# Patient Record
Sex: FEMALE | Race: WHITE | NOT HISPANIC OR LATINO
[De-identification: names, ages, dates, MRNs, and addresses within clinical notes are randomized per-mention and may not be internally consistent; named-entity substitution may affect disease eponyms.]

---

## 2018-06-09 PROBLEM — Z00.00 ENCOUNTER FOR PREVENTIVE HEALTH EXAMINATION: Status: ACTIVE | Noted: 2018-06-09

## 2018-07-09 ENCOUNTER — LABORATORY RESULT (OUTPATIENT)
Age: 33
End: 2018-07-09

## 2018-07-09 ENCOUNTER — APPOINTMENT (OUTPATIENT)
Dept: RHEUMATOLOGY | Facility: CLINIC | Age: 33
End: 2018-07-09
Payer: MEDICAID

## 2018-07-09 VITALS
OXYGEN SATURATION: 98 % | DIASTOLIC BLOOD PRESSURE: 68 MMHG | SYSTOLIC BLOOD PRESSURE: 110 MMHG | HEIGHT: 62 IN | WEIGHT: 100 LBS | RESPIRATION RATE: 20 BRPM | HEART RATE: 81 BPM | BODY MASS INDEX: 18.4 KG/M2

## 2018-07-09 DIAGNOSIS — R21 RASH AND OTHER NONSPECIFIC SKIN ERUPTION: ICD-10-CM

## 2018-07-09 PROCEDURE — 36415 COLL VENOUS BLD VENIPUNCTURE: CPT

## 2018-07-09 PROCEDURE — 99205 OFFICE O/P NEW HI 60 MIN: CPT | Mod: 25

## 2018-07-19 ENCOUNTER — TRANSCRIPTION ENCOUNTER (OUTPATIENT)
Age: 33
End: 2018-07-19

## 2018-07-24 ENCOUNTER — APPOINTMENT (OUTPATIENT)
Dept: RHEUMATOLOGY | Facility: CLINIC | Age: 33
End: 2018-07-24
Payer: MEDICAID

## 2018-07-24 VITALS
SYSTOLIC BLOOD PRESSURE: 107 MMHG | OXYGEN SATURATION: 98 % | HEART RATE: 80 BPM | RESPIRATION RATE: 20 BRPM | DIASTOLIC BLOOD PRESSURE: 73 MMHG

## 2018-07-24 DIAGNOSIS — Z87.39 PERSONAL HISTORY OF OTHER DISEASES OF THE MUSCULOSKELETAL SYSTEM AND CONNECTIVE TISSUE: ICD-10-CM

## 2018-07-24 DIAGNOSIS — R21 RASH AND OTHER NONSPECIFIC SKIN ERUPTION: ICD-10-CM

## 2018-07-24 DIAGNOSIS — Z78.9 OTHER SPECIFIED HEALTH STATUS: ICD-10-CM

## 2018-07-24 PROCEDURE — 99214 OFFICE O/P EST MOD 30 MIN: CPT

## 2018-07-31 ENCOUNTER — RX CHANGE (OUTPATIENT)
Age: 33
End: 2018-07-31

## 2018-07-31 RX ORDER — DOXYCYCLINE HYCLATE 100 MG/1
100 CAPSULE ORAL
Qty: 42 | Refills: 0 | Status: DISCONTINUED | COMMUNITY
Start: 2018-07-25 | End: 2018-07-31

## 2018-09-10 ENCOUNTER — APPOINTMENT (OUTPATIENT)
Dept: RHEUMATOLOGY | Facility: CLINIC | Age: 33
End: 2018-09-10
Payer: MEDICAID

## 2018-09-10 ENCOUNTER — LABORATORY RESULT (OUTPATIENT)
Age: 33
End: 2018-09-10

## 2018-09-10 VITALS
RESPIRATION RATE: 18 BRPM | SYSTOLIC BLOOD PRESSURE: 108 MMHG | HEART RATE: 77 BPM | TEMPERATURE: 98.7 F | DIASTOLIC BLOOD PRESSURE: 77 MMHG | OXYGEN SATURATION: 100 %

## 2018-09-10 PROCEDURE — 99215 OFFICE O/P EST HI 40 MIN: CPT | Mod: 25

## 2018-09-10 PROCEDURE — 36415 COLL VENOUS BLD VENIPUNCTURE: CPT

## 2018-11-02 ENCOUNTER — APPOINTMENT (OUTPATIENT)
Dept: RHEUMATOLOGY | Facility: CLINIC | Age: 33
End: 2018-11-02
Payer: MEDICAID

## 2018-11-02 VITALS
HEART RATE: 77 BPM | SYSTOLIC BLOOD PRESSURE: 110 MMHG | TEMPERATURE: 98.2 F | DIASTOLIC BLOOD PRESSURE: 70 MMHG | OXYGEN SATURATION: 100 % | RESPIRATION RATE: 18 BRPM

## 2018-11-02 DIAGNOSIS — Z86.19 PERSONAL HISTORY OF OTHER INFECTIOUS AND PARASITIC DISEASES: ICD-10-CM

## 2018-11-02 PROCEDURE — 99214 OFFICE O/P EST MOD 30 MIN: CPT

## 2018-11-05 PROBLEM — Z86.19 HISTORY OF LYME DISEASE: Status: RESOLVED | Noted: 2018-07-24 | Resolved: 2018-11-05

## 2018-11-05 RX ORDER — PHENAZOPYRIDINE HYDROCHLORIDE 200 MG/1
200 TABLET ORAL
Qty: 6 | Refills: 0 | Status: COMPLETED | COMMUNITY
Start: 2018-10-08

## 2018-11-05 RX ORDER — CEPHALEXIN 500 MG/1
500 CAPSULE ORAL
Qty: 14 | Refills: 0 | Status: COMPLETED | COMMUNITY
Start: 2018-10-08

## 2018-11-05 RX ORDER — BENZONATATE 200 MG/1
200 CAPSULE ORAL
Qty: 20 | Refills: 0 | Status: COMPLETED | COMMUNITY
Start: 2018-10-08

## 2018-11-28 ENCOUNTER — TRANSCRIPTION ENCOUNTER (OUTPATIENT)
Age: 33
End: 2018-11-28

## 2018-12-20 ENCOUNTER — APPOINTMENT (OUTPATIENT)
Dept: RHEUMATOLOGY | Facility: CLINIC | Age: 33
End: 2018-12-20
Payer: MEDICAID

## 2018-12-20 PROCEDURE — 99214 OFFICE O/P EST MOD 30 MIN: CPT | Mod: 25

## 2018-12-20 PROCEDURE — 36415 COLL VENOUS BLD VENIPUNCTURE: CPT

## 2018-12-20 RX ORDER — CEFUROXIME AXETIL 500 MG/1
500 TABLET ORAL
Qty: 42 | Refills: 0 | Status: DISCONTINUED | COMMUNITY
Start: 2018-07-31 | End: 2018-12-20

## 2018-12-20 RX ORDER — DULOXETINE HYDROCHLORIDE 30 MG/1
30 CAPSULE, DELAYED RELEASE PELLETS ORAL
Qty: 60 | Refills: 2 | Status: DISCONTINUED | COMMUNITY
Start: 2018-09-10 | End: 2018-12-20

## 2019-01-24 LAB
A PHAGOCYTOPH IGG TITR SER IF: NORMAL TITER
A PHAGOCYTOPH IGG TITR SER IF: NORMAL TITER
ALBUMIN MFR SERPL ELPH: 62.6 %
ALBUMIN SERPL ELPH-MCNC: 4.8 G/DL
ALBUMIN SERPL ELPH-MCNC: 4.9 G/DL
ALBUMIN SERPL-MCNC: 4.7 G/DL
ALBUMIN/GLOB SERPL: 1.7 RATIO
ALP BLD-CCNC: 45 U/L
ALP BLD-CCNC: 49 U/L
ALPHA1 GLOB MFR SERPL ELPH: 4.4 %
ALPHA1 GLOB SERPL ELPH-MCNC: 0.3 G/DL
ALPHA2 GLOB MFR SERPL ELPH: 9.6 %
ALPHA2 GLOB SERPL ELPH-MCNC: 0.7 G/DL
ALT SERPL-CCNC: 21 U/L
ALT SERPL-CCNC: 26 U/L
ANA PAT FLD IF-IMP: NORMAL
ANA SER IF-ACNC: ABNORMAL
ANION GAP SERPL CALC-SCNC: 11 MMOL/L
ANION GAP SERPL CALC-SCNC: 12 MMOL/L
APPEARANCE: CLEAR
APTT BLD: 27.7 SEC
AQP4 H2O CHANNEL AB SERPL IA-ACNC: NEGATIVE
AST SERPL-CCNC: 23 U/L
AST SERPL-CCNC: 30 U/L
B BURGDOR AB SER QL IA: NORMAL
B BURGDOR AB SER QL IA: NORMAL
B BURGDOR AB SER-IMP: NEGATIVE
B BURGDOR AB SER-IMP: NEGATIVE
B BURGDOR DNA SPEC QL NAA+PROBE: NEGATIVE
B BURGDOR IGM PATRN SER IB-IMP: NEGATIVE
B BURGDOR IGM PATRN SER IB-IMP: POSITIVE
B BURGDOR18/20KD IGM SER QL IB: NORMAL
B BURGDOR18/20KD IGM SER QL IB: NORMAL
B BURGDOR18KD IGG SER QL IB: NORMAL
B BURGDOR18KD IGG SER QL IB: NORMAL
B BURGDOR23KD IGG SER QL IB: NORMAL
B BURGDOR23KD IGG SER QL IB: NORMAL
B BURGDOR23KD IGM SER QL IB: NORMAL
B BURGDOR23KD IGM SER QL IB: NORMAL
B BURGDOR28KD AB SER QL IB: NORMAL
B BURGDOR28KD AB SER QL IB: NORMAL
B BURGDOR28KD IGG SER QL IB: NORMAL
B BURGDOR28KD IGG SER QL IB: NORMAL
B BURGDOR30KD AB SER QL IB: NORMAL
B BURGDOR30KD AB SER QL IB: NORMAL
B BURGDOR30KD IGG SER QL IB: NORMAL
B BURGDOR30KD IGG SER QL IB: NORMAL
B BURGDOR31KD IGG SER QL IB: NORMAL
B BURGDOR31KD IGG SER QL IB: NORMAL
B BURGDOR31KD IGM SER QL IB: NORMAL
B BURGDOR31KD IGM SER QL IB: NORMAL
B BURGDOR39KD IGG SER QL IB: NORMAL
B BURGDOR39KD IGG SER QL IB: NORMAL
B BURGDOR39KD IGM SER QL IB: PRESENT
B BURGDOR39KD IGM SER QL IB: PRESENT
B BURGDOR41KD IGG SER QL IB: PRESENT
B BURGDOR41KD IGG SER QL IB: PRESENT
B BURGDOR41KD IGM SER QL IB: NORMAL
B BURGDOR41KD IGM SER QL IB: PRESENT
B BURGDOR45KD AB SER QL IB: NORMAL
B BURGDOR45KD AB SER QL IB: NORMAL
B BURGDOR45KD IGG SER QL IB: NORMAL
B BURGDOR45KD IGG SER QL IB: NORMAL
B BURGDOR58KD AB SER QL IB: NORMAL
B BURGDOR58KD AB SER QL IB: NORMAL
B BURGDOR58KD IGG SER QL IB: PRESENT
B BURGDOR58KD IGG SER QL IB: PRESENT
B BURGDOR66KD IGG SER QL IB: NORMAL
B BURGDOR66KD IGG SER QL IB: PRESENT
B BURGDOR66KD IGM SER QL IB: NORMAL
B BURGDOR66KD IGM SER QL IB: NORMAL
B BURGDOR93KD IGG SER QL IB: NORMAL
B BURGDOR93KD IGG SER QL IB: NORMAL
B BURGDOR93KD IGM SER QL IB: NORMAL
B BURGDOR93KD IGM SER QL IB: NORMAL
B MICROTI IGG TITR SER: NORMAL TITER
B MICROTI IGG TITR SER: NORMAL TITER
B-GLOBULIN MFR SERPL ELPH: 10.6 %
B-GLOBULIN SERPL ELPH-MCNC: 0.8 G/DL
B2 GLYCOPROT1 AB SER QL: NEGATIVE
BACTERIA: NEGATIVE
BASOPHILS # BLD AUTO: 0.03 K/UL
BASOPHILS NFR BLD AUTO: 0.3 %
BILIRUB SERPL-MCNC: 0.3 MG/DL
BILIRUB SERPL-MCNC: 0.3 MG/DL
BILIRUBIN URINE: NEGATIVE
BLOOD URINE: NEGATIVE
BUN SERPL-MCNC: 10 MG/DL
BUN SERPL-MCNC: 13 MG/DL
C3 SERPL-MCNC: 130 MG/DL
C4 SERPL-MCNC: 18 MG/DL
CA VI IGA AB: 4.2 EU/ML
CA VI IGG AB: 10.8 EU/ML
CA VI IGM AB: 16.1 EU/ML
CALCIUM SERPL-MCNC: 9.7 MG/DL
CALCIUM SERPL-MCNC: 9.8 MG/DL
CARDIOLIPIN AB SER IA-ACNC: NEGATIVE
CCP AB SER IA-ACNC: <8 UNITS
CENTROMERE IGG SER-ACNC: <0.2 AL
CHLORIDE SERPL-SCNC: 103 MMOL/L
CHLORIDE SERPL-SCNC: 104 MMOL/L
CHROMATIN AB SERPL-ACNC: <0.2 AL
CO2 SERPL-SCNC: 24 MMOL/L
CO2 SERPL-SCNC: 26 MMOL/L
COLOR: YELLOW
CONFIRM: 21.4 SEC
CREAT SERPL-MCNC: 0.87 MG/DL
CREAT SERPL-MCNC: 0.92 MG/DL
CRP SERPL-MCNC: 0.11 MG/DL
CRP SERPL-MCNC: <0.2 MG/DL
DEPRECATED KAPPA LC FREE/LAMBDA SER: 0.79 RATIO
DRVVT IMM 1:2 NP PPP: NORMAL
DRVVT SCREEN TO CONFIRM RATIO: 1.13 RATIO
DSDNA AB SER-ACNC: <12 IU/ML
DSDNA AB SER-ACNC: <12 IU/ML
E CHAFFEENSIS IGG TITR SER IF: NORMAL TITER
E CHAFFEENSIS IGG TITR SER IF: NORMAL TITER
ENA RNP AB SER IA-ACNC: 1.1 AL
ENA SCL70 IGG SER IA-ACNC: 1.1 AL
ENA SM AB SER IA-ACNC: <0.2 AL
ENA SS-A AB SER IA-ACNC: <0.2 AL
ENA SS-B AB SER IA-ACNC: <0.2 AL
ENDOMYSIUM IGA SER QL: NEGATIVE
ENDOMYSIUM IGA TITR SER: NORMAL
EOSINOPHIL # BLD AUTO: 0.08 K/UL
EOSINOPHIL NFR BLD AUTO: 0.8 %
ERYTHROCYTE [SEDIMENTATION RATE] IN BLOOD BY WESTERGREN METHOD: 2 MM/HR
FOLATE SERPL-MCNC: >20 NG/ML
G6PD SER-CCNC: 13.2 U/G HGB
GAMMA GLOB FLD ELPH-MCNC: 1 G/DL
GAMMA GLOB MFR SERPL ELPH: 12.8 %
GLIADIN IGA SER QL: <5 UNITS
GLIADIN IGG SER QL: <5 UNITS
GLIADIN PEPTIDE IGA SER-ACNC: NEGATIVE
GLIADIN PEPTIDE IGG SER-ACNC: NEGATIVE
GLUCOSE QUALITATIVE U: NEGATIVE MG/DL
GLUCOSE SERPL-MCNC: 102 MG/DL
GLUCOSE SERPL-MCNC: 105 MG/DL
HCT VFR BLD CALC: 40.5 %
HGB BLD-MCNC: 13.1 G/DL
HISTONE AB SER QL: 0.4 UNITS
HYALINE CASTS: 2 /LPF
IGA SER QL IEP: 96 MG/DL
IGG SER QL IEP: 1023 MG/DL
IGM SER QL IEP: 219 MG/DL
IMM GRANULOCYTES NFR BLD AUTO: 0.2 %
INTERPRETATION SERPL IEP-IMP: NORMAL
KAPPA LC CSF-MCNC: 0.94 MG/DL
KAPPA LC SERPL-MCNC: 0.74 MG/DL
KETONES URINE: NEGATIVE
LEUKOCYTE ESTERASE URINE: ABNORMAL
LYMPHOCYTES # BLD AUTO: 3.04 K/UL
LYMPHOCYTES NFR BLD AUTO: 32 %
M PROTEIN SPEC IFE-MCNC: NORMAL
MAN DIFF?: NORMAL
MCHC RBC-ENTMCNC: 32.3 GM/DL
MCHC RBC-ENTMCNC: 32.3 PG
MCV RBC AUTO: 99.8 FL
MICROSCOPIC-UA: NORMAL
MISCELLANEOUS TEST: NORMAL
MONOCYTES # BLD AUTO: 0.57 K/UL
MONOCYTES NFR BLD AUTO: 6 %
NEUTROPHILS # BLD AUTO: 5.77 K/UL
NEUTROPHILS NFR BLD AUTO: 60.7 %
NITRITE URINE: NEGATIVE
PARANEOPLASTIC AB PNL SER: NORMAL
PH URINE: 6.5
PLATELET # BLD AUTO: 371 K/UL
POTASSIUM SERPL-SCNC: 4.6 MMOL/L
POTASSIUM SERPL-SCNC: 5 MMOL/L
PROC NAME: NORMAL
PROT SERPL-MCNC: 7.2 G/DL
PROT SERPL-MCNC: 7.5 G/DL
PROTEIN URINE: NEGATIVE MG/DL
PSP IGA AB: 12.6 EU/ML
PSP IGG AB: 5.1 EU/ML
PSP IGM AB: 9.4 EU/ML
RBC # BLD: 4.06 M/UL
RBC # FLD: 13.5 %
RED BLOOD CELLS URINE: 0 /HPF
RF+CCP IGG SER-IMP: NEGATIVE
RHEUMATOID FACT SER QL: 8 IU/ML
RIBOSOMAL P AB SER IA-ACNC: <0.2 AL
RNA POLYMERASE III IGG: <10 U
SCREEN DRVVT: 29.8 SEC
SEROLOGY COMMENTS: NORMAL
SILICA CLOTTING TIME INTERPRETATION: NORMAL
SILICA CLOTTING TIME S/C: 1.1 RATIO
SODIUM SERPL-SCNC: 139 MMOL/L
SODIUM SERPL-SCNC: 141 MMOL/L
SP-1 IGA AB: 6.4 EU/ML
SP-1 IGG AB: 2.4 EU/ML
SP-1 IGM AB: 12.9 EU/ML
SPECIFIC GRAVITY URINE: 1.01
SQUAMOUS EPITHELIAL CELLS: 4 /HPF
SSDNA AB SER-ACNC: <20 EU
SSDNA AB SER-ACNC: <20 EU
THYROGLOB AB SERPL-ACNC: <20 IU/ML
THYROPEROXIDASE AB SERPL IA-ACNC: 11.7 IU/ML
TTG IGA SER IA-ACNC: <5 UNITS
TTG IGA SER-ACNC: NEGATIVE
TTG IGG SER IA-ACNC: <5 UNITS
TTG IGG SER IA-ACNC: NEGATIVE
URATE SERPL-MCNC: 4.8 MG/DL
UROBILINOGEN URINE: NEGATIVE MG/DL
VIT B12 SERPL-MCNC: 715 PG/ML
WBC # FLD AUTO: 9.51 K/UL
WHITE BLOOD CELLS URINE: 0 /HPF

## 2019-01-31 ENCOUNTER — APPOINTMENT (OUTPATIENT)
Dept: RHEUMATOLOGY | Facility: CLINIC | Age: 34
End: 2019-01-31
Payer: COMMERCIAL

## 2019-01-31 VITALS
HEART RATE: 96 BPM | OXYGEN SATURATION: 97 % | BODY MASS INDEX: 18.4 KG/M2 | SYSTOLIC BLOOD PRESSURE: 126 MMHG | DIASTOLIC BLOOD PRESSURE: 80 MMHG | RESPIRATION RATE: 14 BRPM | WEIGHT: 100 LBS | HEIGHT: 62 IN

## 2019-01-31 DIAGNOSIS — R63.0 ANOREXIA: ICD-10-CM

## 2019-01-31 PROCEDURE — 99214 OFFICE O/P EST MOD 30 MIN: CPT

## 2019-01-31 RX ORDER — BUPROPION HYDROCHLORIDE 150 MG/1
150 TABLET, EXTENDED RELEASE ORAL DAILY
Qty: 30 | Refills: 2 | Status: DISCONTINUED | COMMUNITY
Start: 2018-12-20 | End: 2019-01-31

## 2019-02-04 PROBLEM — R63.0 ANOREXIA: Status: ACTIVE | Noted: 2018-12-20

## 2019-03-02 ENCOUNTER — TRANSCRIPTION ENCOUNTER (OUTPATIENT)
Age: 34
End: 2019-03-02

## 2019-03-29 ENCOUNTER — APPOINTMENT (OUTPATIENT)
Dept: RHEUMATOLOGY | Facility: CLINIC | Age: 34
End: 2019-03-29
Payer: COMMERCIAL

## 2019-03-29 VITALS
OXYGEN SATURATION: 100 % | RESPIRATION RATE: 14 BRPM | HEART RATE: 92 BPM | DIASTOLIC BLOOD PRESSURE: 77 MMHG | SYSTOLIC BLOOD PRESSURE: 114 MMHG

## 2019-03-29 DIAGNOSIS — B35.4 TINEA CORPORIS: ICD-10-CM

## 2019-03-29 PROCEDURE — 36415 COLL VENOUS BLD VENIPUNCTURE: CPT

## 2019-03-29 PROCEDURE — 99215 OFFICE O/P EST HI 40 MIN: CPT | Mod: 25

## 2019-03-29 RX ORDER — ACETAMINOPHEN 500 MG/1
TABLET ORAL
Refills: 0 | Status: DISCONTINUED | COMMUNITY
End: 2019-03-29

## 2019-04-12 LAB
ALBUMIN SERPL ELPH-MCNC: 4.5 G/DL
ALP BLD-CCNC: 44 U/L
ALT SERPL-CCNC: 28 U/L
ANION GAP SERPL CALC-SCNC: 14 MMOL/L
AST SERPL-CCNC: 25 U/L
BASOPHILS # BLD AUTO: 0.06 K/UL
BASOPHILS NFR BLD AUTO: 0.5 %
BILIRUB SERPL-MCNC: 0.2 MG/DL
BUN SERPL-MCNC: 8 MG/DL
CALCIUM SERPL-MCNC: 9.6 MG/DL
CHLORIDE SERPL-SCNC: 102 MMOL/L
CO2 SERPL-SCNC: 26 MMOL/L
CREAT SERPL-MCNC: 0.9 MG/DL
CRP SERPL-MCNC: 0.26 MG/DL
ENA RNP AB SER IA-ACNC: 0.9 AL
ENA SCL70 IGG SER IA-ACNC: 1.2 AL
ENA SM AB SER IA-ACNC: <0.2 AL
EOSINOPHIL # BLD AUTO: 0.09 K/UL
EOSINOPHIL NFR BLD AUTO: 0.8 %
ERYTHROCYTE [SEDIMENTATION RATE] IN BLOOD BY WESTERGREN METHOD: 2 MM/HR
GLUCOSE SERPL-MCNC: 85 MG/DL
HCT VFR BLD CALC: 41.6 %
HGB BLD-MCNC: 13 G/DL
IMM GRANULOCYTES NFR BLD AUTO: 0.2 %
LYMPHOCYTES # BLD AUTO: 4.17 K/UL
LYMPHOCYTES NFR BLD AUTO: 36.6 %
MAN DIFF?: NORMAL
MCHC RBC-ENTMCNC: 31.3 GM/DL
MCHC RBC-ENTMCNC: 31.9 PG
MCV RBC AUTO: 102.2 FL
MONOCYTES # BLD AUTO: 0.53 K/UL
MONOCYTES NFR BLD AUTO: 4.6 %
NEUTROPHILS # BLD AUTO: 6.53 K/UL
NEUTROPHILS NFR BLD AUTO: 57.3 %
PLATELET # BLD AUTO: 352 K/UL
POTASSIUM SERPL-SCNC: 4.5 MMOL/L
PROT SERPL-MCNC: 7 G/DL
RBC # BLD: 4.07 M/UL
RBC # FLD: 12.5 %
SODIUM SERPL-SCNC: 142 MMOL/L
WBC # FLD AUTO: 11.4 K/UL

## 2019-04-15 ENCOUNTER — TRANSCRIPTION ENCOUNTER (OUTPATIENT)
Age: 34
End: 2019-04-15

## 2019-06-24 ENCOUNTER — APPOINTMENT (OUTPATIENT)
Dept: RHEUMATOLOGY | Facility: CLINIC | Age: 34
End: 2019-06-24
Payer: COMMERCIAL

## 2019-06-24 VITALS
HEART RATE: 85 BPM | DIASTOLIC BLOOD PRESSURE: 83 MMHG | TEMPERATURE: 98.3 F | OXYGEN SATURATION: 100 % | SYSTOLIC BLOOD PRESSURE: 137 MMHG

## 2019-06-24 DIAGNOSIS — R76.8 OTHER SPECIFIED ABNORMAL IMMUNOLOGICAL FINDINGS IN SERUM: ICD-10-CM

## 2019-06-24 DIAGNOSIS — M25.50 PAIN IN UNSPECIFIED JOINT: ICD-10-CM

## 2019-06-24 DIAGNOSIS — M79.7 FIBROMYALGIA: ICD-10-CM

## 2019-06-24 DIAGNOSIS — L65.9 NONSCARRING HAIR LOSS, UNSPECIFIED: ICD-10-CM

## 2019-06-24 DIAGNOSIS — K11.7 SYSTEMIC INVOLVEMENT OF CONNECTIVE TISSUE, UNSPECIFIED: ICD-10-CM

## 2019-06-24 DIAGNOSIS — M35.9 SYSTEMIC INVOLVEMENT OF CONNECTIVE TISSUE, UNSPECIFIED: ICD-10-CM

## 2019-06-24 DIAGNOSIS — R63.4 ABNORMAL WEIGHT LOSS: ICD-10-CM

## 2019-06-24 DIAGNOSIS — G25.2 OTHER SPECIFIED FORMS OF TREMOR: ICD-10-CM

## 2019-06-24 DIAGNOSIS — I73.00 RAYNAUD'S SYNDROME W/OUT GANGRENE: ICD-10-CM

## 2019-06-24 DIAGNOSIS — R21 RASH AND OTHER NONSPECIFIC SKIN ERUPTION: ICD-10-CM

## 2019-06-24 PROCEDURE — 36415 COLL VENOUS BLD VENIPUNCTURE: CPT

## 2019-06-24 PROCEDURE — 99214 OFFICE O/P EST MOD 30 MIN: CPT | Mod: 25

## 2019-06-24 RX ORDER — CLOTRIMAZOLE AND BETAMETHASONE DIPROPIONATE 10; .5 MG/G; MG/G
1-0.05 CREAM TOPICAL TWICE DAILY
Qty: 1 | Refills: 0 | Status: DISCONTINUED | COMMUNITY
Start: 2019-04-15 | End: 2019-06-24

## 2019-06-24 NOTE — HISTORY OF PRESENT ILLNESS
[___ Week(s) Ago] : [unfilled] week(s) ago [FreeTextEntry1] : - mild arthralgias, not very bothersome but persistent at times. Worsens after exertion or stress.\par - Raynaud's mildly bothersome at times, but denies ulcers/pits currently. Has been fairly stable and not active recently Reports livedo\par - systemic sclerosis panel negative, [facility] labs w/ low Scl70, not likely significant as well as +RNP.\par - fatigue has improved but persists. Has good and bad days\par - discussed start other SNRI \par - ROS unchanged overall\par - discussed options of other po meds vs possible topical compounded creams which were not covered by her insurance [de-identified] : \par \par All other ROS negative except as mentioned in HPI.

## 2019-06-24 NOTE — PHYSICAL EXAM
[General Appearance - In No Acute Distress] : in no acute distress [General Appearance - Alert] : alert [PERRL With Normal Accommodation] : pupils were equal in size, round, and reactive to light [Extraocular Movements] : extraocular movements were intact [Sclera] : the sclera and conjunctiva were normal [Hearing Threshold Finger Rub Not [place]] : hearing was normal [Outer Ear] : the ears and nose were normal in appearance [Oropharynx] : the oropharynx was normal [Nasal Cavity] : the nasal mucosa and septum were normal [Examination Of The Oral Cavity] : the lips and gums were normal [Neck Appearance] : the appearance of the neck was normal [Neck Cervical Mass (___cm)] : no neck mass was observed [Thyroid Diffuse Enlargement] : the thyroid was not enlarged [Jugular Venous Distention Increased] : there was no jugular-venous distention [Respiration, Rhythm And Depth] : normal respiratory rhythm and effort [Thyroid Nodule] : there were no palpable thyroid nodules [Heart Rate And Rhythm] : heart rate was normal and rhythm regular [Auscultation Breath Sounds / Voice Sounds] : lungs were clear to auscultation bilaterally [Heart Sounds] : normal S1 and S2 [Murmurs] : no murmurs [Heart Sounds Gallop] : no gallops [Full Pulse] : the pedal pulses are present [Edema] : there was no peripheral edema [Heart Sounds Pericardial Friction Rub] : no pericardial rub [Abdomen Soft] : soft [Bowel Sounds] : normal bowel sounds [Abdomen Tenderness] : non-tender [Abdomen Mass (___ Cm)] : no abdominal mass palpated [Cervical Lymph Nodes Enlarged Posterior Bilaterally] : posterior cervical [No CVA Tenderness] : no ~M costovertebral angle tenderness [Cervical Lymph Nodes Enlarged Anterior Bilaterally] : anterior cervical [Supraclavicular Lymph Nodes Enlarged Bilaterally] : supraclavicular [No Spinal Tenderness] : no spinal tenderness [Abnormal Walk] : normal gait [Nail Clubbing] : no clubbing  or cyanosis of the fingernails [Motor Tone] : muscle strength and tone were normal [Musculoskeletal - Swelling] : no joint swelling seen [Skin Turgor] : normal skin turgor [Skin Color & Pigmentation] : normal skin color and pigmentation [] : no rash [FreeTextEntry1] : \par Hands- normal\par Wrists- normal\par Elbows- normal\par Shoulders- normal\par Hips- normal\par Knees- normal\par Ankles- normal\par Feet- normal\par MMT 10/10 proximally/distally bilaterally  [Skin Lesions] : no skin lesions [Maculopapular Rash] : A maculopapular rash was noted [Erythematous] : that was erythematous [Excoriated] : that was not excoriated [Blanches with Pressure] : that did not neftali with pressure [Lower Extremities] : on the lower extremities [Trunk] : on the trunk [Motor Exam] : the motor exam was normal [Deep Tendon Reflexes (DTR)] : deep tendon reflexes were 2+ and symmetric [Sensation] : the sensory exam was normal to light touch and pinprick [No Focal Deficits] : no focal deficits [Oriented To Time, Place, And Person] : oriented to person, place, and time [Impaired Insight] : insight and judgment were intact [Mood] : the mood was normal [Affect] : the affect was normal

## 2019-06-24 NOTE — CONSULT LETTER
[Consult Letter:] : I had the pleasure of evaluating your patient, [unfilled]. [Dear  ___] : Dear  [unfilled], [Please see my note below.] : Please see my note below. [Consult Closing:] : Thank you very much for allowing me to participate in the care of this patient.  If you have any questions, please do not hesitate to contact me. [Sincerely,] : Sincerely, [FreeTextEntry3] : Yuri Barreto II, MD\par \par Attending Rheumatologist\par Division of Rheumatology\par [facility]\par    [facility] &\par    Rheumatology at [place],\par    [facility]\par \par \par [facility]\par \par Office: [phone]; [phone] ([place])\par Fax:     [phone]; [phone] ([place])

## 2019-06-24 NOTE — ASSESSMENT
[FreeTextEntry1] : 1. Lyme disease - presenting w/ chronic joint pains, rash on LE.  Unable to tolerate doxy, changed to 21d course of cefuroxime.  Repeat negative.  DNA PCR negative.\par 2. Positive serologies - previous labs w/ slightly positive Scl70 and moderate titer dsDNA.  Repeat serologies still w/ very slight positive Scl70+ and RNP.  DsDNA now negative and 3 separate tests!  Needs to repeat with persistent mild symptoms of Raynaud's, rash, arthralgias..Scleroderma panel normal with Scl70 negative on this test.\par 3. miscarriage - one early first trimester \par 4. Raynaud's - stable disease, will need to follow and trend serologies over time\par 5. Intention tremor - no other neurological effects, has had normal TSH and evaluation in past.\par 6. Chronic fatigue - most bothersome complaint, consider fibromyalgia - no active inflammatory component of disease, serologies negative.\par 7. Xerostomia - no serological evidence of Sjögren's, stable disease\par 8. Anorexia - decreased appetite and po intake. Wt loss over last months.  Has night sweats but denies fever, respiratory complaints.\par \par - remain off duloxetine, Wellbutrin, etc  Start venlafaxine ER 75mg QHS\par - APAP 650-1000mg TID prn\par - ibuprofen 200-800mg 3x weekly at most as needed\par - topical HC BID, if no improvement can give trial of clotrimazole or terbinafine topical\par \par RTO 3 mos

## 2019-07-26 ENCOUNTER — RX CHANGE (OUTPATIENT)
Age: 34
End: 2019-07-26

## 2019-08-12 ENCOUNTER — APPOINTMENT (OUTPATIENT)
Dept: RHEUMATOLOGY | Facility: CLINIC | Age: 34
End: 2019-08-12

## 2019-10-16 ENCOUNTER — FORM ENCOUNTER (OUTPATIENT)
Age: 34
End: 2019-10-16

## 2019-10-17 ENCOUNTER — OUTPATIENT (OUTPATIENT)
Dept: OUTPATIENT SERVICES | Facility: HOSPITAL | Age: 34
LOS: 1 days | End: 2019-10-17
Payer: COMMERCIAL

## 2019-10-17 DIAGNOSIS — R00.0 TACHYCARDIA, UNSPECIFIED: ICD-10-CM

## 2019-10-17 DIAGNOSIS — R94.31 ABNORMAL ELECTROCARDIOGRAM [ECG] [EKG]: ICD-10-CM

## 2019-10-17 DIAGNOSIS — R55 SYNCOPE AND COLLAPSE: ICD-10-CM

## 2019-10-17 PROCEDURE — 93306 TTE W/DOPPLER COMPLETE: CPT

## 2019-10-17 PROCEDURE — 93306 TTE W/DOPPLER COMPLETE: CPT | Mod: 26

## 2019-12-06 LAB
14-3-3 ETA AG SER IA-MCNC: <0.2 NG/ML
ALBUMIN SERPL ELPH-MCNC: 4.9 G/DL
ALP BLD-CCNC: 51 U/L
ALT SERPL-CCNC: 18 U/L
ANION GAP SERPL CALC-SCNC: 14 MMOL/L
AST SERPL-CCNC: 20 U/L
BASOPHILS # BLD AUTO: 0.04 K/UL
BASOPHILS NFR BLD AUTO: 0.4 %
BILIRUB SERPL-MCNC: 0.2 MG/DL
BUN SERPL-MCNC: 9 MG/DL
CALCIUM SERPL-MCNC: 9.8 MG/DL
CHLORIDE SERPL-SCNC: 105 MMOL/L
CK SERPL-CCNC: 157 U/L
CO2 SERPL-SCNC: 22 MMOL/L
CREAT SERPL-MCNC: 1.05 MG/DL
CRP SERPL-MCNC: 0.3 MG/DL
EOSINOPHIL # BLD AUTO: 0.06 K/UL
EOSINOPHIL NFR BLD AUTO: 0.7 %
ERYTHROCYTE [SEDIMENTATION RATE] IN BLOOD BY WESTERGREN METHOD: 21 MM/HR
GLUCOSE SERPL-MCNC: 92 MG/DL
HCT VFR BLD CALC: 43.1 %
HGB BLD-MCNC: 13.6 G/DL
IMM GRANULOCYTES NFR BLD AUTO: 0.2 %
LYMPHOCYTES # BLD AUTO: 3.35 K/UL
LYMPHOCYTES NFR BLD AUTO: 36.9 %
MAN DIFF?: NORMAL
MCHC RBC-ENTMCNC: 31.6 GM/DL
MCHC RBC-ENTMCNC: 32.1 PG
MCV RBC AUTO: 101.7 FL
MISCELLANEOUS TEST: NORMAL
MONOCYTES # BLD AUTO: 0.45 K/UL
MONOCYTES NFR BLD AUTO: 5 %
NEUTROPHILS # BLD AUTO: 5.16 K/UL
NEUTROPHILS NFR BLD AUTO: 56.8 %
PLATELET # BLD AUTO: 334 K/UL
POTASSIUM SERPL-SCNC: 4.6 MMOL/L
PROC NAME: NORMAL
PROT SERPL-MCNC: 7.4 G/DL
RBC # BLD: 4.24 M/UL
RBC # FLD: 13.2 %
SODIUM SERPL-SCNC: 141 MMOL/L
WBC # FLD AUTO: 9.08 K/UL

## 2019-12-26 ENCOUNTER — APPOINTMENT (OUTPATIENT)
Dept: ELECTROPHYSIOLOGY | Facility: CLINIC | Age: 34
End: 2019-12-26
Payer: COMMERCIAL

## 2019-12-26 ENCOUNTER — NON-APPOINTMENT (OUTPATIENT)
Age: 34
End: 2019-12-26

## 2019-12-26 VITALS
HEART RATE: 77 BPM | HEIGHT: 62 IN | DIASTOLIC BLOOD PRESSURE: 73 MMHG | BODY MASS INDEX: 18.4 KG/M2 | WEIGHT: 100 LBS | SYSTOLIC BLOOD PRESSURE: 111 MMHG | OXYGEN SATURATION: 100 %

## 2019-12-26 DIAGNOSIS — F41.9 ANXIETY DISORDER, UNSPECIFIED: ICD-10-CM

## 2019-12-26 DIAGNOSIS — Z87.898 PERSONAL HISTORY OF OTHER SPECIFIED CONDITIONS: ICD-10-CM

## 2019-12-26 DIAGNOSIS — Z82.49 FAMILY HISTORY OF ISCHEMIC HEART DISEASE AND OTHER DISEASES OF THE CIRCULATORY SYSTEM: ICD-10-CM

## 2019-12-26 DIAGNOSIS — Z56.0 UNEMPLOYMENT, UNSPECIFIED: ICD-10-CM

## 2019-12-26 DIAGNOSIS — Z83.3 FAMILY HISTORY OF DIABETES MELLITUS: ICD-10-CM

## 2019-12-26 DIAGNOSIS — G47.00 INSOMNIA, UNSPECIFIED: ICD-10-CM

## 2019-12-26 DIAGNOSIS — Z78.9 OTHER SPECIFIED HEALTH STATUS: ICD-10-CM

## 2019-12-26 PROCEDURE — 99244 OFF/OP CNSLTJ NEW/EST MOD 40: CPT

## 2019-12-26 PROCEDURE — 93000 ELECTROCARDIOGRAM COMPLETE: CPT | Mod: 59

## 2019-12-26 RX ORDER — VENLAFAXINE HYDROCHLORIDE 75 MG/1
75 CAPSULE, EXTENDED RELEASE ORAL
Qty: 90 | Refills: 2 | Status: DISCONTINUED | COMMUNITY
Start: 2019-06-24 | End: 2019-12-26

## 2019-12-26 SDOH — ECONOMIC STABILITY - INCOME SECURITY: UNEMPLOYMENT, UNSPECIFIED: Z56.0

## 2019-12-26 NOTE — HISTORY OF PRESENT ILLNESS
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 \par She has a history of elevated HR for many years, which she was previously told was related to a “hyperactive adrenal gland” or anxiety/tachycardia and she has been maintained on Toprol 50mg qd for about 10 years. \par Over the last 5 years she has noted episodes of sudden onset rapid palpitation, with associated chest discomfort and sensation of presyncope, and last from 5 min to 30 minutes, and stop somewhat quickly as well. These episodes often occur with exertion, but also often occur at rest and awaken her from sleep regularly. She does report some anxiety but believes these symptoms are usually unrelated to anxiety/emotion. The episodes have occurred more frequently and with more intensity over the last few years, and are now interfering with her ability to be active and take care of her children. She has seen a cardiology for years and has worn event monitors and Holter monitors, but did not have symptoms in the past during monitoring and no arrhythmias have been identified. \par She denies associated syncope, but did have syncope shortly after her last childbirth. \par She has been accostumed to metoprolol, and feels unwell when she stops it. In addition, she has had low BP which has limited her ability to tolerate this and other medications. \par ECG reveals sinus rhythm with short MA segment (106 ms) but no evidence of preexcitation. \par

## 2019-12-26 NOTE — PHYSICAL EXAM
[General Appearance - Well Developed] : well developed [General Appearance - Well Nourished] : well nourished [Well Groomed] : well groomed [Normal Conjunctiva] : the conjunctiva exhibited no abnormalities [FreeTextEntry1] : petite thin woman  [General Appearance - In No Acute Distress] : no acute distress [Normal Oral Mucosa] : normal oral mucosa [Normal Jugular Venous V Waves Present] : normal jugular venous V waves present [Murmurs] : no murmurs present [Heart Rate And Rhythm] : heart rate and rhythm were normal [Heart Sounds] : normal S1 and S2 [Edema] : no peripheral edema present [Respiration, Rhythm And Depth] : normal respiratory rhythm and effort [Auscultation Breath Sounds / Voice Sounds] : lungs were clear to auscultation bilaterally [Bowel Sounds] : normal bowel sounds [Abdomen Soft] : soft [Cyanosis, Localized] : no localized cyanosis [Nail Clubbing] : no clubbing of the fingernails [Abnormal Walk] : normal gait [Impaired Insight] : insight and judgment were intact [] : no rash [Oriented To Time, Place, And Person] : oriented to person, place, and time [No Anxiety] : not feeling anxious

## 2019-12-26 NOTE — DISCUSSION/SUMMARY
[FreeTextEntry1] : 34 year old woman with history of arthralgia, Lyme disease, suspected SLE, anorexia, fibromyalgia, presenting for evaluation of palpitation and suspected paroxysmal arrhythmia. She has a long history of palpitation, but over the last several years has had sudden episodes of rapid palpitation without clear trigger that are suspicious for paroxysmal supraventricular tachycardia (pSVT). ECG does reveal a short TX segment, which has been associated with pts with AVNRT, but there is no evidence of preexcitation. Her symptoms have been progressive despite being on metoprolol, and she does have difficulty with medical management due to low BP. We discussed that it is possible she has SVT, or otherwise may have sinus tachycardia or palpitation unrelated to arrhythmia. Will get event monitor to correlate her rhythm with her symptoms. Given suspicion for SVT and severe symptoms despite trials of medical management, we did also discuss potential for EP study and SVT ablation if an SVT is identified. She is anxious for long-term improvement and does want to proceed with this option. The risks and benefits of EPS/SVT ablation were discussed in detail, including procedure related risks such has bleeding, vascular injury, cardiac perforation, stroke, and heart block requiring pacemaker implant. She expressed understanding and does want to proceed. \par -1 week MCOT with symptom correlation. Pt instructed to keep a symptom diary\par -EPS/SVT ablation as above. Hold metoprolol the night prior to the procedure\par -vagal maneuvers discussed. \par

## 2019-12-26 NOTE — REASON FOR VISIT
[Consultation] : a consultation regarding [Supraventricular Tachycardia] : supraventricular tachycardia [Palpitations] : palpitations [FreeTextEntry1] : ref Dr Back [Spouse] : spouse

## 2019-12-26 NOTE — REVIEW OF SYSTEMS
[Feeling Fatigued] : not feeling fatigued [Fever] : no fever [Shortness Of Breath] : no shortness of breath [Dyspnea on exertion] : not dyspnea during exertion [Chest Pain] : chest pain [Lower Ext Edema] : no extremity edema [Palpitations] : palpitations [Dizziness] : dizziness [Joint Pain] : joint pain [Convulsions] : no convulsions [Anxiety] : anxiety [Easy Bleeding] : no tendency for easy bleeding [Easy Bruising] : no tendency for easy bruising [Negative] : Integumentary

## 2020-02-25 ENCOUNTER — OUTPATIENT (OUTPATIENT)
Dept: OUTPATIENT SERVICES | Facility: HOSPITAL | Age: 35
LOS: 1 days | End: 2020-02-25
Payer: COMMERCIAL

## 2020-02-25 VITALS
WEIGHT: 102.96 LBS | HEIGHT: 62 IN | DIASTOLIC BLOOD PRESSURE: 67 MMHG | HEART RATE: 91 BPM | RESPIRATION RATE: 18 BRPM | SYSTOLIC BLOOD PRESSURE: 119 MMHG | OXYGEN SATURATION: 100 % | TEMPERATURE: 98 F

## 2020-02-25 VITALS
DIASTOLIC BLOOD PRESSURE: 67 MMHG | TEMPERATURE: 98 F | OXYGEN SATURATION: 100 % | RESPIRATION RATE: 16 BRPM | HEART RATE: 91 BPM | HEIGHT: 62 IN | WEIGHT: 102.96 LBS | SYSTOLIC BLOOD PRESSURE: 119 MMHG

## 2020-02-25 DIAGNOSIS — Z98.890 OTHER SPECIFIED POSTPROCEDURAL STATES: Chronic | ICD-10-CM

## 2020-02-25 DIAGNOSIS — Z01.818 ENCOUNTER FOR OTHER PREPROCEDURAL EXAMINATION: ICD-10-CM

## 2020-02-25 LAB
ANION GAP SERPL CALC-SCNC: 11 MMOL/L — SIGNIFICANT CHANGE UP (ref 5–17)
APTT BLD: 27.6 SEC — SIGNIFICANT CHANGE UP (ref 27.5–36.3)
BLD GP AB SCN SERPL QL: SIGNIFICANT CHANGE UP
BUN SERPL-MCNC: 10 MG/DL — SIGNIFICANT CHANGE UP (ref 8–20)
CALCIUM SERPL-MCNC: 9.3 MG/DL — SIGNIFICANT CHANGE UP (ref 8.6–10.2)
CHLORIDE SERPL-SCNC: 103 MMOL/L — SIGNIFICANT CHANGE UP (ref 98–107)
CO2 SERPL-SCNC: 23 MMOL/L — SIGNIFICANT CHANGE UP (ref 22–29)
CREAT SERPL-MCNC: 0.87 MG/DL — SIGNIFICANT CHANGE UP (ref 0.5–1.3)
GLUCOSE SERPL-MCNC: 115 MG/DL — HIGH (ref 70–99)
HCG SERPL QL: NEGATIVE — SIGNIFICANT CHANGE UP
HCT VFR BLD CALC: 38.2 % — SIGNIFICANT CHANGE UP (ref 34.5–45)
HGB BLD-MCNC: 12.5 G/DL — SIGNIFICANT CHANGE UP (ref 11.5–15.5)
INR BLD: 0.92 RATIO — SIGNIFICANT CHANGE UP (ref 0.88–1.16)
MAGNESIUM SERPL-MCNC: 1.9 MG/DL — SIGNIFICANT CHANGE UP (ref 1.6–2.6)
MCHC RBC-ENTMCNC: 31.9 PG — SIGNIFICANT CHANGE UP (ref 27–34)
MCHC RBC-ENTMCNC: 32.7 GM/DL — SIGNIFICANT CHANGE UP (ref 32–36)
MCV RBC AUTO: 97.4 FL — SIGNIFICANT CHANGE UP (ref 80–100)
PLATELET # BLD AUTO: 321 K/UL — SIGNIFICANT CHANGE UP (ref 150–400)
POTASSIUM SERPL-MCNC: 3.9 MMOL/L — SIGNIFICANT CHANGE UP (ref 3.5–5.3)
POTASSIUM SERPL-SCNC: 3.9 MMOL/L — SIGNIFICANT CHANGE UP (ref 3.5–5.3)
PROTHROM AB SERPL-ACNC: 10.4 SEC — SIGNIFICANT CHANGE UP (ref 10–12.9)
RBC # BLD: 3.92 M/UL — SIGNIFICANT CHANGE UP (ref 3.8–5.2)
RBC # FLD: 13 % — SIGNIFICANT CHANGE UP (ref 10.3–14.5)
SODIUM SERPL-SCNC: 137 MMOL/L — SIGNIFICANT CHANGE UP (ref 135–145)
WBC # BLD: 9.65 K/UL — SIGNIFICANT CHANGE UP (ref 3.8–10.5)
WBC # FLD AUTO: 9.65 K/UL — SIGNIFICANT CHANGE UP (ref 3.8–10.5)

## 2020-02-25 PROCEDURE — G0463: CPT

## 2020-02-25 PROCEDURE — 93005 ELECTROCARDIOGRAM TRACING: CPT

## 2020-02-25 PROCEDURE — 93010 ELECTROCARDIOGRAM REPORT: CPT

## 2020-02-25 RX ORDER — ALPRAZOLAM 0.25 MG
1 TABLET ORAL
Qty: 0 | Refills: 0 | DISCHARGE

## 2020-02-25 NOTE — H&P PST ADULT - HISTORY OF PRESENT ILLNESS
34 year old female with history of Lyme disease w/chronic joint pain, Raynaud's syndrome, fibromyalgia, suspected SLE and long standing 34 year old female with history of Lyme disease w/chronic joint pain, Raynaud's syndrome, fibromyalgia, suspected SLE and long standing history of palpitations.  Her symptoms have been persistent and progressive despite beta blocker therapy, with sudden onset suspicious for pSVT.      Cardiology summary:   TTE 10/17/2019:    1. Left ventricular ejection fraction, by visual estimation, is 60 to 65%.   2. There is no evidence of pericardial effusion.  Twin Back MD Electronically signed on 10/18/2019 at 12:47:52 PM    MCOT 12/26/2019-1/2/2020:   Min. HR 44 Max. , sinus rhythm, no arrhythmia appreciated 34 year old female with history of Lyme disease w/chronic joint pain, Raynaud's syndrome, fibromyalgia, suspected SLE and long standing history of palpitations.  She was first diagnosed with tachycardia approx. 10 years ago and was prescribed metoprolol.  Her symptoms have been persistent and progressive despite beta blocker therapy, with sudden onset suspicious for pSVT.  EKG reveals sinus rhythm w/short OK segment but no evidence of preexcitation.  She presents today for PST in anticipation of EPS +/- SVT ablation.       Cardiology summary:   TTE 10/17/2019:    1. Left ventricular ejection fraction, by visual estimation, is 60 to 65%.   2. There is no evidence of pericardial effusion.  Twin Back MD Electronically signed on 10/18/2019 at 12:47:52 PM    MCOT 12/26/2019-1/2/2020:   Min. HR 44 Max. , sinus rhythm, no arrhythmia appreciated

## 2020-02-25 NOTE — H&P PST ADULT - RS GEN HX ROS MEA POS PC
Subjective:       Patient ID: Cruz Hutchinson is a 39 y.o. male.    Chief Complaint: Hypertension (Follow up)    HPI   The patient is a 39-year-old who is here today for follow-up on his hypertension.  He is currently taking Diovan 80 mg once a day.  His blood pressures at home have ranged from 117-134/67-84 with a heart rate of 78-89.  He is doing well with the Diovan and does not feel as if his blood pressures have been high or low    We did discuss his hyperuricemia with the last uric acid level of 9.1.  He is not interested in allopurinol at this time.  He does not recall last gouty episode he had       Review of Systems   Constitutional: Negative for appetite change, chills, diaphoresis, fatigue, fever and unexpected weight change.   HENT: Negative for congestion, ear pain, postnasal drip, rhinorrhea, sinus pressure, sneezing, sore throat and trouble swallowing.    Eyes: Negative for pain, discharge and visual disturbance.   Respiratory: Negative for cough, chest tightness, shortness of breath and wheezing.    Cardiovascular: Negative for chest pain, palpitations and leg swelling.   Gastrointestinal: Negative for abdominal distention, abdominal pain, blood in stool, constipation, diarrhea, nausea and vomiting.   Skin: Negative for rash.       Objective:      Physical Exam   Constitutional: He is oriented to person, place, and time. He appears well-developed and well-nourished. No distress.   HENT:   Head: Normocephalic and atraumatic.   Right Ear: Hearing, tympanic membrane, external ear and ear canal normal.   Left Ear: Hearing, tympanic membrane, external ear and ear canal normal.   Nose: Nose normal.   Mouth/Throat: Oropharynx is clear and moist and mucous membranes are normal. No oral lesions. No oropharyngeal exudate, posterior oropharyngeal edema or posterior oropharyngeal erythema.   Eyes: Conjunctivae, EOM and lids are normal. Pupils are equal, round, and reactive to light. No scleral icterus.   Neck:  "Normal range of motion. Neck supple. Carotid bruit is not present. No thyroid mass and no thyromegaly present.   Cardiovascular: Normal rate, regular rhythm and normal heart sounds.   No extrasystoles are present. PMI is not displaced.  Exam reveals no gallop.    No murmur heard.  Pulmonary/Chest: Effort normal and breath sounds normal. No accessory muscle usage. No respiratory distress.   Clear to auscultation bilaterally.   Abdominal: Soft. Normal appearance and bowel sounds are normal. He exhibits no abdominal bruit. There is no hepatosplenomegaly. There is no tenderness. There is no rebound.   Lymphadenopathy:        Head (right side): No submental and no submandibular adenopathy present.        Head (left side): No submental and no submandibular adenopathy present.        Right cervical: No superficial cervical, no deep cervical and no posterior cervical adenopathy present.       Left cervical: No superficial cervical, no deep cervical and no posterior cervical adenopathy present.        Right: No supraclavicular adenopathy present.        Left: No supraclavicular adenopathy present.   Neurological: He is alert and oriented to person, place, and time.   Skin: Skin is warm, dry and intact.   Psychiatric: He has a normal mood and affect.     Blood pressure 126/70, pulse 88, temperature 98.7 °F (37.1 °C), temperature source Oral, resp. rate 18, height 5' 7" (1.702 m), weight 76.9 kg (169 lb 8.5 oz).Body mass index is 26.55 kg/m².          A/P:  1) hypertension.  Well controlled.  Continue with Diovan.  If his blood pressures are consistently outside of 110-135 over 70-85 range, he will let me know.    1)  Hyperuricemia with history of gout.  We did discuss potential complications of the hyperuricemia and recurrent gout.  We discussed the pros and cons of allopurinol.  He will consider this further and let me know if he wishes to start the allopurinol    I will see him back in 6-12 months or sooner if needed  " dyspnea

## 2020-02-25 NOTE — H&P PST ADULT - NSANTHOSAYNRD_GEN_A_CORE
No. GUERO screening performed.  STOP BANG Legend: 0-2 = LOW Risk; 3-4 = INTERMEDIATE Risk; 5-8 = HIGH Risk

## 2020-02-25 NOTE — H&P PST ADULT - RS GEN PE MLT RESP DETAILS PC
airway patent/breath sounds equal/good air movement/no rales/no rhonchi/clear to auscultation bilaterally/no wheezes

## 2020-02-25 NOTE — H&P PST ADULT - ASSESSMENT
34 year old female with history of Lyme disease w/chronic joint pain, Raynaud's syndrome, fibromyalgia, suspected SLE and long standing history of palpitations.  She was first diagnosed with tachycardia approx. 10 years ago and was prescribed metoprolol.  Her symptoms have been persistent and progressive despite beta blocker therapy, with sudden onset suspicious for pSVT.  EKG reveals sinus rhythm w/short [place] segment but no evidence of preexcitation.  She presents today for PST in anticipation of EPS +/- SVT ablation.      Plan:   Pre-procedure instructions provided (verbal & written) as follows:  EPS +/- SVT Ablation 3/3/2020   - NPO after midnight prior except meds w/ sips of water.    - Last dose metoprolol Sat. 2/29/2020 PM   Discharge teaching initiated.

## 2020-02-25 NOTE — H&P PST ADULT - NSICDXPASTSURGICALHX_GEN_ALL_CORE_FT
PAST SURGICAL HISTORY:  H/O laparoscopy 2' endometriosis    H/O oral surgery     History of removal of cyst scalp X 2

## 2020-02-25 NOTE — H&P PST ADULT - NSICDXPASTMEDICALHX_GEN_ALL_CORE_FT
PAST MEDICAL HISTORY:  Fibromyalgia     Lyme arthritis     Raynauds syndrome PAST MEDICAL HISTORY:  Anxiety     Endometriosis     Fibromyalgia     H/O alopecia     Insomnia     Lyme arthritis     Raynauds syndrome

## 2020-02-25 NOTE — ASU PATIENT PROFILE, ADULT - PMH
Anxiety    Endometriosis    Fibromyalgia    H/O alopecia    Insomnia    Lyme arthritis    Raynauds syndrome

## 2020-03-03 ENCOUNTER — INPATIENT (INPATIENT)
Facility: HOSPITAL | Age: 35
LOS: 1 days | Discharge: HOME | DRG: 274 | End: 2020-03-05
Attending: STUDENT IN AN ORGANIZED HEALTH CARE EDUCATION/TRAINING PROGRAM | Admitting: STUDENT IN AN ORGANIZED HEALTH CARE EDUCATION/TRAINING PROGRAM
Payer: COMMERCIAL

## 2020-03-03 VITALS
RESPIRATION RATE: 12 BRPM | OXYGEN SATURATION: 98 % | TEMPERATURE: 99 F | DIASTOLIC BLOOD PRESSURE: 80 MMHG | SYSTOLIC BLOOD PRESSURE: 119 MMHG | HEART RATE: 100 BPM

## 2020-03-03 DIAGNOSIS — R00.0 TACHYCARDIA, UNSPECIFIED: ICD-10-CM

## 2020-03-03 DIAGNOSIS — Z98.890 OTHER SPECIFIED POSTPROCEDURAL STATES: Chronic | ICD-10-CM

## 2020-03-03 LAB
ABO RH CONFIRMATION: SIGNIFICANT CHANGE UP
ALBUMIN SERPL ELPH-MCNC: 3.9 G/DL — SIGNIFICANT CHANGE UP (ref 3.3–5.2)
ALP SERPL-CCNC: 36 U/L — LOW (ref 40–120)
ALT FLD-CCNC: 13 U/L — SIGNIFICANT CHANGE UP
ANION GAP SERPL CALC-SCNC: 11 MMOL/L — SIGNIFICANT CHANGE UP (ref 5–17)
AST SERPL-CCNC: 30 U/L — SIGNIFICANT CHANGE UP
BILIRUB SERPL-MCNC: 0.2 MG/DL — LOW (ref 0.4–2)
BUN SERPL-MCNC: 7 MG/DL — LOW (ref 8–20)
CALCIUM SERPL-MCNC: 8.4 MG/DL — LOW (ref 8.6–10.2)
CHLORIDE SERPL-SCNC: 108 MMOL/L — HIGH (ref 98–107)
CO2 SERPL-SCNC: 22 MMOL/L — SIGNIFICANT CHANGE UP (ref 22–29)
CREAT SERPL-MCNC: 0.76 MG/DL — SIGNIFICANT CHANGE UP (ref 0.5–1.3)
GLUCOSE SERPL-MCNC: 86 MG/DL — SIGNIFICANT CHANGE UP (ref 70–99)
HCG UR QL: NEGATIVE — SIGNIFICANT CHANGE UP
HCT VFR BLD CALC: 38.3 % — SIGNIFICANT CHANGE UP (ref 34.5–45)
HGB BLD-MCNC: 12.9 G/DL — SIGNIFICANT CHANGE UP (ref 11.5–15.5)
MAGNESIUM SERPL-MCNC: 1.8 MG/DL — SIGNIFICANT CHANGE UP (ref 1.6–2.6)
MCHC RBC-ENTMCNC: 32.9 PG — SIGNIFICANT CHANGE UP (ref 27–34)
MCHC RBC-ENTMCNC: 33.7 GM/DL — SIGNIFICANT CHANGE UP (ref 32–36)
MCV RBC AUTO: 97.7 FL — SIGNIFICANT CHANGE UP (ref 80–100)
PLATELET # BLD AUTO: 243 K/UL — SIGNIFICANT CHANGE UP (ref 150–400)
POTASSIUM SERPL-MCNC: 4.5 MMOL/L — SIGNIFICANT CHANGE UP (ref 3.5–5.3)
POTASSIUM SERPL-SCNC: 4.5 MMOL/L — SIGNIFICANT CHANGE UP (ref 3.5–5.3)
PROT SERPL-MCNC: 5.9 G/DL — LOW (ref 6.6–8.7)
RBC # BLD: 3.92 M/UL — SIGNIFICANT CHANGE UP (ref 3.8–5.2)
RBC # FLD: 13.2 % — SIGNIFICANT CHANGE UP (ref 10.3–14.5)
SODIUM SERPL-SCNC: 141 MMOL/L — SIGNIFICANT CHANGE UP (ref 135–145)
WBC # BLD: 8.29 K/UL — SIGNIFICANT CHANGE UP (ref 3.8–10.5)
WBC # FLD AUTO: 8.29 K/UL — SIGNIFICANT CHANGE UP (ref 3.8–10.5)

## 2020-03-03 PROCEDURE — 93010 ELECTROCARDIOGRAM REPORT: CPT

## 2020-03-03 PROCEDURE — 93653 COMPRE EP EVAL TX SVT: CPT

## 2020-03-03 PROCEDURE — 93613 INTRACARDIAC EPHYS 3D MAPG: CPT

## 2020-03-03 PROCEDURE — 93621 COMP EP EVL L PAC&REC C SINS: CPT | Mod: 26

## 2020-03-03 PROCEDURE — 93623 PRGRMD STIMJ&PACG IV RX NFS: CPT | Mod: 26

## 2020-03-03 PROCEDURE — 70450 CT HEAD/BRAIN W/O DYE: CPT | Mod: 26

## 2020-03-03 RX ORDER — ACETAMINOPHEN 500 MG
650 TABLET ORAL EVERY 6 HOURS
Refills: 0 | Status: DISCONTINUED | OUTPATIENT
Start: 2020-03-03 | End: 2020-03-05

## 2020-03-03 RX ORDER — KETOROLAC TROMETHAMINE 30 MG/ML
15 SYRINGE (ML) INJECTION ONCE
Refills: 0 | Status: DISCONTINUED | OUTPATIENT
Start: 2020-03-03 | End: 2020-03-03

## 2020-03-03 RX ORDER — ONDANSETRON 8 MG/1
4 TABLET, FILM COATED ORAL EVERY 6 HOURS
Refills: 0 | Status: DISCONTINUED | OUTPATIENT
Start: 2020-03-03 | End: 2020-03-05

## 2020-03-03 RX ORDER — ASPIRIN/CALCIUM CARB/MAGNESIUM 324 MG
81 TABLET ORAL DAILY
Refills: 0 | Status: DISCONTINUED | OUTPATIENT
Start: 2020-03-03 | End: 2020-03-05

## 2020-03-03 RX ORDER — METOPROLOL TARTRATE 50 MG
25 TABLET ORAL DAILY
Refills: 0 | Status: DISCONTINUED | OUTPATIENT
Start: 2020-03-03 | End: 2020-03-05

## 2020-03-03 RX ADMIN — Medication 15 MILLIGRAM(S): at 17:20

## 2020-03-03 RX ADMIN — ONDANSETRON 4 MILLIGRAM(S): 8 TABLET, FILM COATED ORAL at 20:09

## 2020-03-03 NOTE — PROGRESS NOTE ADULT - SUBJECTIVE AND OBJECTIVE BOX
ELECTROPHYSIOLOGY BRIEF POST-OP NOTE    I have personally seen and examined the patient. I agree with the history and physical which I have reviewed and noted any changes below.    PRE-OP DIAGNOSIS: SVT    POST-OP DIAGNOSIS: AVNRT    PROCEDURE: EPS and RFA of AVNRT via right and left femoral vein.     Physician: Bruno Huston MD  Assistant: none    ESTIMATED BLOOD LOSS: none    ANESTHESIA TYPE:  [  ]General Anesthesia  [ x ]Sedation  [  ]Local/Regional    CONDITION:  [  ]Critical  [ x ]Serious  [  ]Stable  [  ]Good    FINDINGS: typical AVNRT    Post operatively as the patient was coming off the EP lab table was noted by staff to have a seizure with associated LOC. Versed was given by anesthesiology. Patient regained consciousness and urgently taken for non contrast CT of the head.    EKG: NSR at 75bpm; QRSD 84ms. VA 102ms    Vital Signs Last 24 Hrs  T(C): 37 (03 Mar 2020 12:50), Max: 37 (03 Mar 2020 12:50)  T(F): 98.6 (03 Mar 2020 12:50), Max: 98.6 (03 Mar 2020 12:50)  HR: 100 (03 Mar 2020 12:50) (100 - 100)  BP: 119/80 (03 Mar 2020 12:50) (119/80 - 119/80)  RR: 12 (03 Mar 2020 12:50) (12 - 12)  SpO2: 98% (03 Mar 2020 12:50) (98% - 98%)    Physical Exam:  Constitutional: NAD, AAOx3, lethargic  Cardiovascular: +S1S2 RRR  Pulmonary: CTA b/l, unlabored  GI: soft NTND +BS  Extremities: no pedal edema  Right and Left Groin: No hematomas. Dressings CDI.   Neuro: non focal, OHARA x4    A/P  34 year old female with history of Lyme disease w/chronic joint pain, Raynaud's syndrome, fibromyalgia, suspected SLE and long standing history of palpitations who is now s/p successful EPS and RFA ov AVNRT.     Post op noted to have seizure like activity which responded to Versed.      -STAT CMP + CBC  -Bedrest x 4 hours  -Admit to ICU level of care  -Reduced beta blocker in half  -ASA 81mg PO daily x 14 days.  -Noncontrast CT performed - awaiting official results  -Neurology consult Dr. Conrad's service made aware. Will see patient tomorrow.  -Seizure precautions  -Will follow closely.

## 2020-03-03 NOTE — PROGRESS NOTE ADULT - SUBJECTIVE AND OBJECTIVE BOX
Admission Criteria  Please admit the patient to the following service: Preferred ICU/Telemetry    Major Criteria:  - Need for adjustment of therapeutic anticoagulation medications  - Altered mental status  - Significant volume load > 200 ml    Admit to: Preferred ICU/Telemetry (1 Major ciriteria/2 or more minor criteria) Patient is being admitted to the inpatient service due to high risk characteristics and need for further management/monitoring and is considered to be at a significantly increased risk of major adverse cardiac and vascular events if discharged.

## 2020-03-04 LAB
ANION GAP SERPL CALC-SCNC: 12 MMOL/L — SIGNIFICANT CHANGE UP (ref 5–17)
BUN SERPL-MCNC: 9 MG/DL — SIGNIFICANT CHANGE UP (ref 8–20)
CALCIUM SERPL-MCNC: 8.5 MG/DL — LOW (ref 8.6–10.2)
CHLORIDE SERPL-SCNC: 105 MMOL/L — SIGNIFICANT CHANGE UP (ref 98–107)
CO2 SERPL-SCNC: 23 MMOL/L — SIGNIFICANT CHANGE UP (ref 22–29)
CREAT SERPL-MCNC: 0.88 MG/DL — SIGNIFICANT CHANGE UP (ref 0.5–1.3)
GLUCOSE SERPL-MCNC: 98 MG/DL — SIGNIFICANT CHANGE UP (ref 70–99)
HCT VFR BLD CALC: 32.9 % — LOW (ref 34.5–45)
HGB BLD-MCNC: 11 G/DL — LOW (ref 11.5–15.5)
MAGNESIUM SERPL-MCNC: 1.9 MG/DL — SIGNIFICANT CHANGE UP (ref 1.8–2.6)
MCHC RBC-ENTMCNC: 32.9 PG — SIGNIFICANT CHANGE UP (ref 27–34)
MCHC RBC-ENTMCNC: 33.4 GM/DL — SIGNIFICANT CHANGE UP (ref 32–36)
MCV RBC AUTO: 98.5 FL — SIGNIFICANT CHANGE UP (ref 80–100)
PLATELET # BLD AUTO: 226 K/UL — SIGNIFICANT CHANGE UP (ref 150–400)
POTASSIUM SERPL-MCNC: 3.9 MMOL/L — SIGNIFICANT CHANGE UP (ref 3.5–5.3)
POTASSIUM SERPL-SCNC: 3.9 MMOL/L — SIGNIFICANT CHANGE UP (ref 3.5–5.3)
RBC # BLD: 3.34 M/UL — LOW (ref 3.8–5.2)
RBC # FLD: 13.3 % — SIGNIFICANT CHANGE UP (ref 10.3–14.5)
SODIUM SERPL-SCNC: 140 MMOL/L — SIGNIFICANT CHANGE UP (ref 135–145)
WBC # BLD: 8.33 K/UL — SIGNIFICANT CHANGE UP (ref 3.8–10.5)
WBC # FLD AUTO: 8.33 K/UL — SIGNIFICANT CHANGE UP (ref 3.8–10.5)

## 2020-03-04 PROCEDURE — 95819 EEG AWAKE AND ASLEEP: CPT | Mod: 26

## 2020-03-04 PROCEDURE — 99223 1ST HOSP IP/OBS HIGH 75: CPT

## 2020-03-04 PROCEDURE — 99233 SBSQ HOSP IP/OBS HIGH 50: CPT

## 2020-03-04 PROCEDURE — 93010 ELECTROCARDIOGRAM REPORT: CPT

## 2020-03-04 RX ORDER — LANOLIN ALCOHOL/MO/W.PET/CERES
3 CREAM (GRAM) TOPICAL AT BEDTIME
Refills: 0 | Status: DISCONTINUED | OUTPATIENT
Start: 2020-03-04 | End: 2020-03-05

## 2020-03-04 RX ORDER — LEVONORGESTREL 1.5 MG/1
0.15 TABLET ORAL ONCE
Refills: 0 | Status: DISCONTINUED | OUTPATIENT
Start: 2020-03-04 | End: 2020-03-04

## 2020-03-04 RX ADMIN — Medication 650 MILLIGRAM(S): at 08:30

## 2020-03-04 RX ADMIN — ONDANSETRON 4 MILLIGRAM(S): 8 TABLET, FILM COATED ORAL at 12:13

## 2020-03-04 RX ADMIN — Medication 650 MILLIGRAM(S): at 23:25

## 2020-03-04 RX ADMIN — Medication 81 MILLIGRAM(S): at 10:56

## 2020-03-04 RX ADMIN — Medication 25 MILLIGRAM(S): at 07:57

## 2020-03-04 RX ADMIN — Medication 650 MILLIGRAM(S): at 17:05

## 2020-03-04 RX ADMIN — Medication 3 MILLIGRAM(S): at 23:24

## 2020-03-04 RX ADMIN — Medication 650 MILLIGRAM(S): at 07:58

## 2020-03-04 NOTE — PROGRESS NOTE ADULT - SUBJECTIVE AND OBJECTIVE BOX
Patient seen today in bed. Dressings removed from right and left groin without complication. Patient reports some brief overnight palpitations she describes as "heart racing." These events correlate with telemetry monitoring as brief sinus tachycardia. No overnight events of seizure like activity.     EKG: NSR at 66bpm; QRSD 82ms  TELE: SR episodes of sinus tachycardia    MEDICATIONS  (STANDING):  aspirin  chewable 81 milliGRAM(s) Oral daily  Lillow (levonorgesterl and ethyl estradi 1 Tablet(s) 1 Tablet(s) Oral daily  marijuana, medical 10 Capsule(s) Oral at bedtime  metoprolol succinate ER 25 milliGRAM(s) Oral daily    MEDICATIONS  (PRN):  acetaminophen   Tablet .. 650 milliGRAM(s) Oral every 6 hours PRN Mild Pain (1 - 3)  LORazepam   Injectable 2 milliGRAM(s) IV Push once PRN seizures  ondansetron Injectable 4 milliGRAM(s) IV Push every 6 hours PRN Nausea and/or Vomiting    Allergies  Lupron Depot (Unknown)    PAST MEDICAL & SURGICAL HISTORY:  Insomnia  Anxiety  H/O alopecia  Endometriosis  Fibromyalgia  Raynauds syndrome  Lyme arthritis  History of removal of cyst: scalp X 2  H/O oral surgery  H/O laparoscopy: 2&#x27; endometriosis    Vital Signs Last 24 Hrs  T(C): 36.7 (04 Mar 2020 05:26), Max: 37 (03 Mar 2020 12:50)  T(F): 98 (04 Mar 2020 05:26), Max: 98.6 (03 Mar 2020 12:50)  HR: 73 (04 Mar 2020 07:56) (64 - 100)  BP: 102/64 (04 Mar 2020 07:56) (100/63 - 121/81)  RR: 16 (04 Mar 2020 05:26) (12 - 16)  SpO2: 96% (04 Mar 2020 05:26) (96% - 100%)    Physical Exam:  Constitutional: NAD, AAOx3  Cardiovascular: +S1S2 RRR  Pulmonary: CTA b/l, unlabored  GI: soft NTND +BS  Extremities: no pedal edema,  Right and Left Groin: No hematoma.   Neuro: non focal, OHARA x4    LABS:                        11.0   8.33  )-----------( 226      ( 04 Mar 2020 05:58 )             32.9     140  |  105  |  9.0  ----------------------------<  98  3.9   |  23.0  |  0.88  Ca    8.5<L>      04 Mar 2020 05:58  Mg     1.9     03-04  TPro  5.9<L>  /  Alb  3.9  /  TBili  0.2<L>  /  DBili  x   /  AST  30  /  ALT  13  /  AlkPhos  36<L>  03-03    RADIOLOGY & ADDITIONAL TESTS:  CT head 3/3/2020  FINDINGS:   No previous examinations are available for review.  The brain demonstrates no abnormal attenuation.   No acute cerebral cortical infarct is seen.  No intracranial hemorrhage is found.  No mass effect is found in the brain.    The ventricles, sulci and basal cisterns appear unremarkable.  The orbits are unremarkable.  The paranasal sinuses are clear.  The nasal cavity appears intact.  The nasopharynx is symmetric.  The central skull base, petrous temporal bones and calvarium remain intact.  IMPRESSION:   Unremarkable head CT.    A/P  34 year old female with history of Lyme disease w/chronic joint pain, Raynaud's syndrome, fibromyalgia, suspected SLE and long standing history of palpitations who is now s/p successful EPS and RFA of AVNRT.     No overnight arrhythmias or seizures.     - Neurology consult pending  - Discharge planning home potentially today pending Neuro recommendations.

## 2020-03-04 NOTE — CONSULT NOTE ADULT - ASSESSMENT
Impression:  Tonic clonic seizure  Marijuana use  SVT s/p ablation    Recommendations:  - EEG  - CT brain performed, awaiting read  - Would give 1 gram of keppra now IV  - If patient has another seizure then please call us back, we will then assume care of the patient, at the moment she does not require ICU admission.  Discussed with Dr Santillan and Dr Huston
34y RH Female with a history of fibromyalgia, endometriosis, lyme disease with residual joint pain, undifferentiated connective tissue disorder, anxiety who had a witnessed bilateral tonic clonic seizure post-op elective RFA for AVNRT. H/o 3 prior bilateral tonic clonic seizures in setting of Lupron use. Multiple other smaller events very concerning for focal seizures.    Impression:  High suspicion for underlying epilepsy. Will record cvEEG off antiepileptic medication.      Recommendation:  - cvEEG   - hold antiepileptic medication for now  - MRI brain w/o epilepsy protocol (may be done as outpt)  - follow-up with me in clinic: [facility] [place] ([phone]), [address] [place] [address]       Thank you for allowing Epilepsy to participate in the care of this patient.   ______________________  Harley Cortez MD   [facility]  Cell: [phone]

## 2020-03-04 NOTE — PROGRESS NOTE ADULT - ATTENDING COMMENTS
Pt feeling well. had some palpitation overnight corresponding to sinus rhythm/sinus tachycardia. no arrhythmias occurred. will continue trial to wean metoprolol (cont 25mg qd).   Getting EEG and neurology followup for seizure which occurred while awakening for anesthesia. Appreciate neurology input. Discharge pending this evaluation.

## 2020-03-04 NOTE — EEG REPORT - NS EEG TEXT BOX
[facility]   COMPREHENSIVE EPILEPSY CENTER   REPORT OF ROUTINE VIDEO EEG     [facility]: [address], [place] [address], Ph#: [phone]  [facility]: [address], [place] [address], Ph#: [phone]  [facility]: [address] [place] [address], Ph#: [phone]    Patient Name: FLASH SWEENEY  Age and : 34y (85)  MRN #: [id number]  Location: [place] [address]  Referring Physician: Bruno Huston    Study Date: 20    _____________________________________________________________  TECHNICAL INFORMATION    Placement and Labeling of Electrodes:  The EEG was performed utilizing 20 channels referential EEG connections (coronal over temporal over parasagittal montage) using all standard 10-20 electrode placements with EKG.  Recording was at a sampling rate of 256 samples per second per channel.  Time synchronized digital video recording was done simultaneously with EEG recording.  A low light infrared camera was used for low light recording.  Moreno and seizure detection algorithms were utilized.    _____________________________________________________________  HISTORY    Patient is a 34y old  Female who presents with a chief complaint of Admission post AVNRT ablation (04 Mar 2020 09:26)      PERTINENT MEDICATION:  none  _____________________________________________________________  STUDY INTERPRETATION    Findings: The background was continuous, spontaneously variable and reactive. During wakefulness, the posterior dominant rhythm consisted of symmetric, well-modulated 10 Hz activity, with amplitude to 80 uV, that attenuated to eye opening.  Low amplitude frontal beta was noted in wakefulness.    Background Slowing:  No generalized background slowing was present.    Focal Slowing:   None were present.    Sleep Background:  Drowsiness was characterized by fragmentation, attenuation, and slowing of the background activity.    Stage II sleep transients were not recorded.    Other Non-Epileptiform Findings:  None were present.    Interictal Epileptiform Activity:   None were present.    Events:  Clinical events: None recorded.  Seizures: None recorded.    Activation Procedures:   Photic stimulation was not performed.   Hyperventilation was performed and did not elicit any abnormality.    Artifacts:  Intermittent myogenic and movement artifacts were noted.    ECG:  The heart rate on single channel ECG was predominantly between 60-70 BPM.    _____________________________________________________________  EEG SUMMARY/CLASSIFICATION    Abnormal EEG in the awake, drowsy states.    _____________________________________________________________  EEG IMPRESSION/CLINICAL CORRELATE    Normal EEG study.  No epileptiform pattern or seizure seen.    _____________________________________________________________    Harley Cortez MD  Attending Physician, [facility] [place]

## 2020-03-04 NOTE — CONSULT NOTE ADULT - SUBJECTIVE AND OBJECTIVE BOX
EPILEPSY PRELIM NOTE:    After lengthy interview with patient, story very suspicious for underlying epilepsy rather than acute symptomatic seizure. Will further eval with cvEEG. No antiepileptic medication for now. [facility]                                                                      Harley Cortez MD                                         Office: [address] [place] [address], [address]                                                 Phone: [phone]; Fax: [phone]                            ==============================================    EPILEPSY INITIAL CONSULT NOTE      ADMITTING DIAGNOSIS: Tachycardia      HPI:  This is a 34y RH Female with a history of fibromyalgia, endometriosis, lyme disease with residual joint pain, undifferentiated connective tissue disorder, anxiety who originally presented as  elective admission for EPS for SVT, and was diagnosed with AVNRT s/p RFA. Post op, pt had a convulsive seizure <1m. Upon further questioning, pt has had additional seizures and seizure-like events concerning for underlying epilepsy.    About 12 yrs ago, patient was on Lupron for endometriosis, and had 3 bilateral tonic clonic seizures (2 within 24hrs, the other one 2-3 wks later). For all 3 seizures, patient remember feeling "off" the morning of, and whole-body tingling sensation progressing to BUE tonic flexion toward chest before LOC and convulsion. Lupron was discontinued. Few years later, began to have different seizure-like events as described below (type #2).    SEIZURE/SPELL DESCRIPTION AND TYPE:  Type #1  Severity: bilateral tonic clonic seizure, unknown onset  Onset: 2008  Quality & associated signs/symptoms: whole-body tingling sensation -> BUE tonic flexion toward chest -> LOC, tonic-clonic movements  Duration: few min  Timing: 3 in 2008 (while on Lupron; 2 within 24hrs, the other one 2-3 wks later), 1 on 3/3/20 (post-op EPS)  Modifying factors: triggered by Lupron and anesthetics?   Diurnal Variation: none    Type #2  Severity: suspicious for focal aware and focal impaired awareness seizures  Onset: mid 2010's  Quality & associated signs/symptoms: Any of following presenting as isolated phenomena - intense tyler vu, tight knot or free fall sensation in abd, waking up at night with palpitation, metallic/gasoline smell. May be followed by nausea or impaired awareness for a few minutes.  Duration: seconds to minutes  Timing: few times a month (except palpitation is nightly)  Modifying factors: unknown trigger    Diurnal Variation: none    SEIZURE RISK FACTORS:  Fell on cement pavement at [age] yo, needed stitches in forehead. Patient was a product of normal pregnancy and delivery. No history of febrile seizure, CNS infection, or FH of seizures.    CURRENT AED:  none    PREVIOUS AED:  none    IMAGING:   CTH 3/3/20 ([facility]): unremarkable    NEUROPHYSIOLOGY:  never had EEG    NEUROPSYCHOLOGY:   none    PMH:  AVNRT s/p RFA, fibromyalgia, endometriosis, lyme disease with residual joint pain, undifferentiated connective tissue disorder, anxiety    PSH:  History of removal of cyst: scalp X 2  H/O oral surgery  H/O laparoscopy: 2&#x27; endometriosis  RFA for AVNRT     ALLERGIES:  lupron    FH:  non-contributory    SH:  No E/T/D. Two children. 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 On contraceptives; no plan for more kids.     MEDICATIONS:   acetaminophen   Tablet .. 650 milliGRAM(s) Oral every 6 hours PRN Mild Pain (1 - 3)  aspirin  chewable 81 milliGRAM(s) Oral daily  Lillow (levonorgesterl and ethyl estradi 1 Tablet(s) 1 Tablet(s) Oral daily  LORazepam   Injectable 2 milliGRAM(s) IV Push once PRN seizures  marijuana, medical 10 Capsule(s) Oral at bedtime  melatonin 3 milliGRAM(s) Oral at bedtime PRN Insomnia  metoprolol succinate ER 25 milliGRAM(s) Oral daily  ondansetron Injectable 4 milliGRAM(s) IV Push every 6 hours PRN Nausea and/or Vomiting    ROS:  Neurology as per HPI, otherwise negative for constitutional, skin, eyes, ENT, respiratory, cardiovascular, gastrointestinal, genitourinary, musculoskeletal, psychiatric, hematology/lymphatics, endocrine, allergic/immunologic.    VITALS:  T(C): 36.9 (03-04-20 @ 20:20), Max: 36.9 (03-04-20 @ 09:31)  HR: 65 (03-04-20 @ 20:20) (64 - 76)  BP: 110/60 (03-04-20 @ 20:20) (100/63 - 117/78)  ABP: --  RR: 18 (03-04-20 @ 20:20) (14 - 20)  SpO2: 98% (03-04-20 @ 20:20) (96% - 100%)  CVP(cm H2O): --    GENERAL PHYSICAL EXAM:  GEN: no distress  HEENT:  NCAT, OP clear  EYES: sclera white, conjunctiva clear, no nystagmus  NECK: supple  CV: RRR, no murmur     		  PULM: CTAB, no wheezing  GI: soft ABD, +BS, NT  EXT: peripheral pulse intact, no cyanosis  MSK: muscle tone and strength normal  SKIN: warm, dry, no rash or lesion on exposed skin    NEUROLOGICAL EXAM:  Mental Status  Orientation: alert and oriented to person, place, time, and situation   Language: clear and fluent, intact comprehension and repetition, intact naming and reading    Cranial Nerves  II: full visual fields intact   III, IV, VI: PERRL, EOMI  V, VII: facial sensation and movement intact and symmetric   VIII: hearing intact   IX, X: uvula midline, soft palate elevates normally   XI: BL shoulder shrug intact   XII: tongue midline    Motor  5/5 BUE and BLE                 Tone and bulk are normal in upper and lower limbs  No pronator drift    Sensation  Intact to light touch and pinprick in all 4 EXTs    Reflex  2+ in BL biceps, triceps, brachioradialis, patella, ankle                                      Plantar responses downward bilaterally    Coordination  Normal FTN bilaterally    Gait  Deferred      LABS:                          11.0   8.33  )-----------( 226      ( 04 Mar 2020 05:58 )             32.9     03-04    140  |  105  |  9.0  ----------------------------<  98  3.9   |  23.0  |  0.88    Ca    8.5<L>      04 Mar 2020 05:58  Mg     1.9     03-04    TPro  5.9<L>  /  Alb  3.9  /  TBili  0.2<L>  /  DBili  x   /  AST  30  /  ALT  13  /  AlkPhos  36<L>  03-03    CAPILLARY BLOOD GLUCOSE        LIVER FUNCTIONS - ( 03 Mar 2020 17:12 )  Alb: 3.9 g/dL / Pro: 5.9 g/dL / ALK PHOS: 36 U/L / ALT: 13 U/L / AST: 30 U/L / GGT: x

## 2020-03-05 ENCOUNTER — TRANSCRIPTION ENCOUNTER (OUTPATIENT)
Age: 35
End: 2020-03-05

## 2020-03-05 VITALS
RESPIRATION RATE: 18 BRPM | SYSTOLIC BLOOD PRESSURE: 102 MMHG | OXYGEN SATURATION: 97 % | DIASTOLIC BLOOD PRESSURE: 63 MMHG | TEMPERATURE: 98 F | HEART RATE: 62 BPM

## 2020-03-05 DIAGNOSIS — R56.9 UNSPECIFIED CONVULSIONS: ICD-10-CM

## 2020-03-05 LAB
ANION GAP SERPL CALC-SCNC: 12 MMOL/L — SIGNIFICANT CHANGE UP (ref 5–17)
BUN SERPL-MCNC: 8 MG/DL — SIGNIFICANT CHANGE UP (ref 8–20)
CALCIUM SERPL-MCNC: 8.9 MG/DL — SIGNIFICANT CHANGE UP (ref 8.6–10.2)
CHLORIDE SERPL-SCNC: 103 MMOL/L — SIGNIFICANT CHANGE UP (ref 98–107)
CO2 SERPL-SCNC: 23 MMOL/L — SIGNIFICANT CHANGE UP (ref 22–29)
CREAT SERPL-MCNC: 0.87 MG/DL — SIGNIFICANT CHANGE UP (ref 0.5–1.3)
GLUCOSE SERPL-MCNC: 81 MG/DL — SIGNIFICANT CHANGE UP (ref 70–99)
HCT VFR BLD CALC: 34.3 % — LOW (ref 34.5–45)
HGB BLD-MCNC: 11.6 G/DL — SIGNIFICANT CHANGE UP (ref 11.5–15.5)
MAGNESIUM SERPL-MCNC: 1.8 MG/DL — SIGNIFICANT CHANGE UP (ref 1.6–2.6)
MCHC RBC-ENTMCNC: 33.2 PG — SIGNIFICANT CHANGE UP (ref 27–34)
MCHC RBC-ENTMCNC: 33.8 GM/DL — SIGNIFICANT CHANGE UP (ref 32–36)
MCV RBC AUTO: 98.3 FL — SIGNIFICANT CHANGE UP (ref 80–100)
PLATELET # BLD AUTO: 210 K/UL — SIGNIFICANT CHANGE UP (ref 150–400)
POTASSIUM SERPL-MCNC: 4.4 MMOL/L — SIGNIFICANT CHANGE UP (ref 3.5–5.3)
POTASSIUM SERPL-SCNC: 4.4 MMOL/L — SIGNIFICANT CHANGE UP (ref 3.5–5.3)
RBC # BLD: 3.49 M/UL — LOW (ref 3.8–5.2)
RBC # FLD: 13 % — SIGNIFICANT CHANGE UP (ref 10.3–14.5)
SODIUM SERPL-SCNC: 138 MMOL/L — SIGNIFICANT CHANGE UP (ref 135–145)
WBC # BLD: 6.96 K/UL — SIGNIFICANT CHANGE UP (ref 3.8–10.5)
WBC # FLD AUTO: 6.96 K/UL — SIGNIFICANT CHANGE UP (ref 3.8–10.5)

## 2020-03-05 PROCEDURE — 70450 CT HEAD/BRAIN W/O DYE: CPT

## 2020-03-05 PROCEDURE — 99233 SBSQ HOSP IP/OBS HIGH 50: CPT

## 2020-03-05 PROCEDURE — C1766: CPT

## 2020-03-05 PROCEDURE — 83735 ASSAY OF MAGNESIUM: CPT

## 2020-03-05 PROCEDURE — 81025 URINE PREGNANCY TEST: CPT

## 2020-03-05 PROCEDURE — 93653 COMPRE EP EVAL TX SVT: CPT

## 2020-03-05 PROCEDURE — C1731: CPT

## 2020-03-05 PROCEDURE — 93621 COMP EP EVL L PAC&REC C SINS: CPT

## 2020-03-05 PROCEDURE — 93623 PRGRMD STIMJ&PACG IV RX NFS: CPT

## 2020-03-05 PROCEDURE — 99238 HOSP IP/OBS DSCHRG MGMT 30/<: CPT

## 2020-03-05 PROCEDURE — 95707 EEG W/O VID 2-12HR CONT MNTR: CPT

## 2020-03-05 PROCEDURE — 93005 ELECTROCARDIOGRAM TRACING: CPT

## 2020-03-05 PROCEDURE — 93613 INTRACARDIAC EPHYS 3D MAPG: CPT

## 2020-03-05 PROCEDURE — 85027 COMPLETE CBC AUTOMATED: CPT

## 2020-03-05 PROCEDURE — 36415 COLL VENOUS BLD VENIPUNCTURE: CPT

## 2020-03-05 PROCEDURE — 80053 COMPREHEN METABOLIC PANEL: CPT

## 2020-03-05 PROCEDURE — 95720 EEG PHY/QHP EA INCR W/VEEG: CPT

## 2020-03-05 PROCEDURE — C1732: CPT

## 2020-03-05 PROCEDURE — 80048 BASIC METABOLIC PNL TOTAL CA: CPT

## 2020-03-05 PROCEDURE — 93010 ELECTROCARDIOGRAM REPORT: CPT

## 2020-03-05 PROCEDURE — C1730: CPT

## 2020-03-05 RX ORDER — METOPROLOL TARTRATE 50 MG
1 TABLET ORAL
Qty: 0 | Refills: 0 | DISCHARGE
Start: 2020-03-05

## 2020-03-05 RX ORDER — ASPIRIN/CALCIUM CARB/MAGNESIUM 324 MG
1 TABLET ORAL
Qty: 0 | Refills: 0 | DISCHARGE
Start: 2020-03-05

## 2020-03-05 RX ORDER — ACETAMINOPHEN 500 MG
2 TABLET ORAL
Qty: 0 | Refills: 0 | DISCHARGE
Start: 2020-03-05

## 2020-03-05 RX ORDER — METOPROLOL TARTRATE 50 MG
1 TABLET ORAL
Qty: 30 | Refills: 1
Start: 2020-03-05 | End: 2020-05-03

## 2020-03-05 RX ADMIN — ONDANSETRON 4 MILLIGRAM(S): 8 TABLET, FILM COATED ORAL at 11:19

## 2020-03-05 RX ADMIN — Medication 650 MILLIGRAM(S): at 00:20

## 2020-03-05 RX ADMIN — Medication 81 MILLIGRAM(S): at 11:16

## 2020-03-05 RX ADMIN — Medication 650 MILLIGRAM(S): at 05:30

## 2020-03-05 RX ADMIN — Medication 650 MILLIGRAM(S): at 06:30

## 2020-03-05 RX ADMIN — Medication 650 MILLIGRAM(S): at 11:17

## 2020-03-05 RX ADMIN — Medication 25 MILLIGRAM(S): at 12:33

## 2020-03-05 NOTE — DISCHARGE NOTE PROVIDER - NSDCCPTREATMENT_GEN_ALL_CORE_FT
PRINCIPAL PROCEDURE  Procedure: Cardiac ablation  Findings and Treatment: [facility] will call you to schedule a 2 week post op follow up, please call [phone] if you dont hear from them or need to be seen sooner.  - Bruising at the groin, sometimes extending down the leg, and/or a small lump under the skin at the groin access site is normal and will resolve within 2 – 3 weeks.   - Occasional skipped beats or palpitations that last for a few beats are common and generally resolve within 1-2 months.   - You make walk and take stairs at a regular pace.   - Do not perform any exercise more strenuous than walking for 1 week.   - Do not strain or lift heavy objects for 1 week.  - You may shower the day after the procedure.  - Do not soak in water (such as tub baths, hot tubs, swimming, etc.) for 1 week.   - You may resume all other activities the day after the procedure.  Call your doctor if:   - you notice bleeding, redness, drainage, swelling, increased tenderness or a hot sensation around the catheter insertion site.   - your temperature is greater than 100 degrees F for more than 24 hours.  - your rapid heart rhythm returns.  - you have any questions or concerns regarding the procedure.  If significant bleeding and/or a large lump (the size of a golf ball or bigger) occurs:  - Lie flat and apply continuous direct pressure just above the puncture site for at least 10 minutes  - If the issue resolves, notify your physician immediately.    - If the bleeding cannot be controlled, please seek immediate medical attention.  If you experience increased difficulty breathing or chest pain, or if you faint or have dizzy spells, please seek immediate medical attention.        SECONDARY PROCEDURE  Procedure: EEG, awake and asleep  Findings and Treatment: -start and continue Lamotrigine medication. Please  at [facility] in [facility].  -Please follow up with Dr Harley Cortez at [facility] in one - two weeks, sooner if needed

## 2020-03-05 NOTE — DISCHARGE NOTE PROVIDER - HOSPITAL COURSE
34 year old female with history of Lyme disease w/chronic joint pain, Raynaud's syndrome, fibromyalgia, suspected SLE and long standing history of palpitations who is now POD #2 s/p successful EPS and RFA of AVNRT (3/3/20). Pt with seizure like activity which occurred while awakening from anesthesia s/p ablation. No further seizure like activity. Neurology follow up appreciated.        1. SVT s/p AVNRT ablation 3/3/20    - ASA 81 mg po daily x 14 days    - decrease metoprolol to 25mg po daily    - groin care, restrictions and outpt follow up reviewed with pt and fiance, all questions answered    - f/u Dr Huston 2-4 weeks, sooner if needed        2. possible seizure    - head ct was wnl    - neurology/epilepsy follow up appreciated:     Normal EEG overnight, but recording did not capture any of seizure-like events. Since suspicion for epilepsy remains high, will start lamotrigine, which also a great med for patient's anxiety.    Start LTG. Educated pt on side effects, including monitoring for rash daily. Stop taking LTG and call MD immediately if rash appears.     Week 1, 2: 0/25mg		    Week 3, 4: 25/25mg    Week 5, 6: 25/50mg    Week 7, 8: 50/50mg    Week 9, 10: 50/100mg    Week 11 and onward: 100/100mg    Script for lamotrigine is already sent to [facility] pharmacy downstairs.    Follow-up with me ( Dr Harley Cortez) in clinic: [facility] at [place] ([phone]), [facility] [place] [address]     - F/U Dr Cortez 1-2 weeks sooner if needed

## 2020-03-05 NOTE — PROGRESS NOTE ADULT - ASSESSMENT
Normal EEG overnight, but recording did not capture any of seizure-like events. Since suspicion for epilepsy remains high, will start lamotrigine, which also a great med for patient's anxiety.    Start LTG. Educated pt on side effects, including monitoring for rash daily. Stop taking LTG and call MD immediately if rash appears.   Week 1, 2: 0/25mg		  Week 3, 4: 25/25mg  Week 5, 6: 25/50mg  Week 7, 8: 50/50mg  Week 9, 10: 50/100mg  Week 11 and onward: 100/100mg    [facility]  Follow-up with me in clinic: [facility] at [place] ([phone]), [address] [place] [address]         Please contact Epilepsy with further question if needed.   ______________________  Harley Cortez MD   [facility]  Cell: [phone] 34y RH Female with a history of fibromyalgia, endometriosis, lyme disease with residual joint pain, undifferentiated connective tissue disorder, anxiety who had a witnessed bilateral tonic clonic seizure post-op elective RFA for AVNRT. H/o 3 prior bilateral tonic clonic seizures in setting of Lupron use. Multiple other smaller events very concerning for focal seizures.    Normal EEG overnight, but recording did not capture any of seizure-like events. Since suspicion for epilepsy remains high, will start lamotrigine, which is also a great med for patient's anxiety.      Recommendation:  - DC cvEEG     - Start lamotrigine. Educated pt on side effects, including monitoring for rash daily. Stop taking LTG and call MD immediately if rash appears.   Week 1, 2: 0/25mg		  Week 3, 4: 25/25mg  Week 5, 6: 25/50mg  Week 7, 8: 50/50mg  Week 9, 10: 50/100mg  Week 11 and onward: 100/100mg    - MRI brain w/o epilepsy protocol (may be done as outpt)  - follow-up with me in clinic: [facility] at [place] ([phone]), [address] [place] [address]       Please contact Epilepsy with further question if needed.   ______________________  Harley Cortez MD   [facility]psy  Cell: [phone]

## 2020-03-05 NOTE — DISCHARGE NOTE PROVIDER - NSDCCPCAREPLAN_GEN_ALL_CORE_FT
PRINCIPAL DISCHARGE DIAGNOSIS  Diagnosis: SVT (supraventricular tachycardia)  Assessment and Plan of Treatment:       SECONDARY DISCHARGE DIAGNOSES  Diagnosis: Seizure  Assessment and Plan of Treatment: possible

## 2020-03-05 NOTE — EEG REPORT - NS EEG TEXT BOX
[facility]   COMPREHENSIVE EPILEPSY CENTER   REPORT OF LONG-TERM VIDEO EEG     [facility]: [address], [place] [address], Ph#: [phone]  [facility]: [id number] [place] [address], Ph#: [phone]  [facility]: [address] [place] [address], Ph#: [phone]    Patient Name: FLASH SWEENEY  Age and : 34y (85)  MRN #: [id number]  Location: [place] [id number]  Referring Physician: Bruno Huston    Start Time/Date: 25:25 on 20  End Time/Date: 08:26 on 20  Duration: 17hr    _____________________________________________________________  STUDY INFORMATION    EEG Recording Technique:  The patient underwent continuous Video-EEG monitoring, using Telemetry System hardware on the XLTek Digital System. EEG and video data were stored on a computer hard drive with important events saved in digital archive files. The material was reviewed by a physician (electroencephalographer / epileptologist) on a daily basis. Moreno and seizure detection algorithms were utilized and reviewed. An EEG Technician attended to the patient, and was available throughout daytime work hours.  The epilepsy center neurologist was available in person or on call 24-hours per day.    EEG Placement and Labeling of Electrodes:  The EEG was performed utilizing 20 channel referential EEG connections (coronal over temporal over parasagittal montage) using all standard 10-20 electrode placements with EKG, with additional electrodes placed in the inferior temporal region using the modified 10-10 montage electrode placements for elective admissions, or if deemed necessary. Recording was at a sampling rate of 256 samples per second per channel. Time synchronized digital video recording was done simultaneously with EEG recording. A low light infrared camera was used for low light recording.     _____________________________________________________________  HISTORY    Patient is a 34y old  Female who presents with a chief complaint of ablation (05 Mar 2020 15:20)      PERTINENT MEDICATION:  none    _____________________________________________________________  STUDY INTERPRETATION    Findings: The background was continuous, spontaneously variable and reactive. During wakefulness, the posterior dominant rhythm consisted of symmetric, well-modulated 10 Hz activity, with amplitude to 30 uV, that attenuated to eye opening.  Low amplitude frontal beta was noted in wakefulness.    Background Slowing:  No generalized background slowing was present.    Focal Slowing:   None were present.    Sleep Background:  Drowsiness was characterized by fragmentation, attenuation, and slowing of the background activity.    Sleep was characterized by the presence of vertex waves, symmetric sleep spindles and K-complexes.    Other Non-Epileptiform Findings:  None were present.    Interictal Epileptiform Activity:   None were present.    Events:  Clinical events: None recorded.  Seizures: None recorded.    Activation Procedures:   Hyperventilation was not performed.    Photic stimulation was not performed.     Artifacts:  Intermittent myogenic and movement artifacts were noted.    ECG:  The heart rate on single channel ECG was predominantly between 60-70 BPM.    _____________________________________________________________  EEG SUMMARY/CLASSIFICATION    Normal EEG in the awake, drowsy and asleep states.    _____________________________________________________________  EEG IMPRESSION/CLINICAL CORRELATE    Normal EEG study.  No epileptiform pattern or seizure seen.    _____________________________________________________________    Harley Cortez MD  Attending Physician, [facility]

## 2020-03-05 NOTE — PROGRESS NOTE ADULT - SUBJECTIVE AND OBJECTIVE BOX
No complaints, no overnight events.  Ambulating without difficulty. She denies CP, palp, SOB/CALLOWAY.  No further seizure like activity.  Epilepsy follow up appreciated.    TELE: sinus rhythm/sinus arrhythmia 70-90bpm      PAST MEDICAL & SURGICAL HISTORY:  Insomnia  Anxiety  H/O alopecia  Endometriosis  Fibromyalgia  Raynauds syndrome  Lyme arthritis  History of removal of cyst: scalp X 2  H/O oral surgery  H/O laparoscopy: 2&#x27; endometriosis      Vital Signs Last 24 Hrs  T(C): 36.7 (05 Mar 2020 10:03), Max: 37.1 (05 Mar 2020 05:26)  T(F): 98 (05 Mar 2020 10:03), Max: 98.8 (05 Mar 2020 05:26)  HR: 82 (05 Mar 2020 12:24) (53 - 89)  BP: 115/73 (05 Mar 2020 12:24) (96/66 - 119/76)  RR: 18 (05 Mar 2020 11:10) (18 - 18)  SpO2: 98% (05 Mar 2020 10:03) (98% - 99%)    Physical Exam:  Constitutional: NAD, AAOx3  Cardiovascular: +S1S2 RRR, no murmur/rub/gallop  Pulmonary: CTA b/l, unlabored  GI: soft NTND +BS  Groins b/l: soft NT no bleeding, swelling, hematoma   Extremities: no pedal edema, +distal pulses b/l  Neuro: non focal, OHARA x4    LABS:                        11.6   6.96  )-----------( 210      ( 05 Mar 2020 05:56 )             34.3     03-05    138  |  103  |  8.0  ----------------------------<  81  4.4   |  23.0  |  0.87    Ca    8.9      05 Mar 2020 05:56  Mg     1.8     03-05    TPro  5.9<L>  /  Alb  3.9  /  TBili  0.2<L>  /  DBili  x   /  AST  30  /  ALT  13  /  AlkPhos  36<L>  03-03    HEAD Ct 3/3/20: IMPRESSION:   Unremarkable head CT.  EEG 3/4/20: Normal EEG study. No epileptiform pattern or seizure seen.    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 Pt with seizure like activity which occurred while awakening from anesthesia s/p ablation. No further seizure like activity. Neurology follow up appreciated.    1. 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 possible seizure  - head ct was wnl  - neurology/epilepsy follow up appreciated:   Normal EEG overnight, but recording did not capture any of seizure-like events. Since suspicion for epilepsy remains high, will start lamotrigine, which also a great med for patient's anxiety.  Start LTG. Educated pt on side effects, including monitoring for rash daily. Stop taking LTG and call MD immediately if rash appears.   Week 1, 2: 0/25mg		  Week 3, 4: 25/25mg  Week 5, 6: 25/50mg  Week 7, 8: 50/50mg  Week 9, 10: 50/100mg  Week 11 and onward: 100/100mg  Script for lamotrigine is already sent to [facility] downstairs.  Follow-up with me ( Dr Harley Cortez) in clinic: [facility] [place] ([phone]), [facility] [place] [address]   - F/U Dr Cortez 1-2 weeks sooner if needed    Pt stable for discharge home, f/u and medications as above  DW Dr Huston, agrees

## 2020-03-05 NOTE — DISCHARGE NOTE NURSING/CASE MANAGEMENT/SOCIAL WORK - PATIENT PORTAL LINK FT
You can access the FollowMyHealth Patient Portal offered by [facility] by registering at the following website: [email or web address]. By joining [facility]’s FollowMyHealth portal, you will also be able to view your health information using other applications (apps) compatible with our system.

## 2020-03-05 NOTE — DISCHARGE NOTE PROVIDER - NSDCMRMEDTOKEN_GEN_ALL_CORE_FT
cbd bath bombs:   Daysee 30 mcg-0.15 mg oral tablet: 1 tab(s) orally once a day  medical maijuana  4-5 vaps daily:   medical marijuana tabs: 1 tab(s) orally , As Needed for apin, anxiety  Metoprolol Succinate ER 50 mg oral tablet, extended release: 1 tab(s) orally once a day acetaminophen 325 mg oral tablet: 2 tab(s) orally every 6 hours, As needed, Mild Pain (1 - 3)  aspirin 81 mg oral tablet, chewable: 1 tab(s) orally once a day x 14 days  cbd bath bombs:   Daysee 30 mcg-0.15 mg oral tablet: 1 tab(s) orally once a day  lamoTRIgine: PLEASE  AND FOLLOW PRESCRIPTION THAT WAS SENT TO [facility] BY DR VIJAY HOPE- NEUROLOGY/EPILEPSY    [phone]  medical maijkana  4-5 vaps daily:   medical marijuana tabs: 1 tab(s) orally , As Needed for apin, anxiety  metoprolol succinate 25 mg oral tablet, extended release: 1 tab(s) orally once a day  metoprolol succinate 25 mg oral tablet, extended release: 1 tab(s) orally once a day acetaminophen 325 mg oral tablet: 2 tab(s) orally every 6 hours, As needed, Mild Pain (1 - 3)  aspirin 81 mg oral tablet, chewable: 1 tab(s) orally once a day x 14 days  cbd bath bombs:   Daysee 30 mcg-0.15 mg oral tablet: 1 tab(s) orally once a day  lamoTRIgine: PLEASE  AND FOLLOW PRESCRIPTION THAT WAS SENT TO [facility] BY DR VIJAY HOPE- NEUROLOGY/EPILEPSY    [phone]  medical maijkana  4-5 vaps daily:   medical marijuana tabs: 1 tab(s) orally , As Needed for apin, anxiety  metoprolol succinate 25 mg oral tablet, extended release: 1 tab(s) orally once a day  metoprolol succinate 25 mg oral tablet, extended release: 1 tab(s) orally once a day

## 2020-03-05 NOTE — DISCHARGE NOTE PROVIDER - CARE PROVIDER_API CALL
Bruno Huston)  Cardiology; Internal Medicine  [address], [address]  [place] [address]  Phone: [phone]  Fax: [phone]  Follow Up Time: 1 month    Diego, Harley  Neurology-Epilepsy   [facility]  Phone: [phone]  Fax: (   )    -  Follow Up Time: 1 week

## 2020-03-05 NOTE — PROGRESS NOTE ADULT - SUBJECTIVE AND OBJECTIVE BOX
EPILEPSY PRELIM NOTE:    Normal EEG overnight, but recording did not capture any of seizure-like events. Since suspicion for epilepsy remains high, will start lamotrigine, which also a great med for patient's anxiety.    Start LTG. Educated pt on side effects, including monitoring for rash daily. Stop taking LTG and call MD immediately if rash appears.   Week 1, 2: 0/25mg		  Week 3, 4: 25/25mg  Week 5, 6: 25/50mg  Week 7, 8: 50/50mg  Week 9, 10: 50/100mg  Week 11 and onward: 100/100mg    [facility]  Follow-up with me in clinic: [facility] [place] ([phone]), [address] [place] [address]         Please contact Epilepsy with further question if needed.   ______________________  Harley Cortez MD   [facility] Epilepsy  Cell: [phone] [facility]                                                                      Harley Cortez MD                                         Office: [facility] [address] [place], [place], [address]                                                 Phone: [phone]; Fax: [phone]                            ==============================================    EPILEPSY FOLLOW-UP NOTE      INTERVAL HISTORY:  Normal cvEEG, but recording didn't capture any seizure-like events. Feeling better today.    MEDICATIONS:   acetaminophen   Tablet .. 650 milliGRAM(s) Oral every 6 hours PRN Mild Pain (1 - 3)  aspirin  chewable 81 milliGRAM(s) Oral daily  Lillow (levonorgesterl and ethyl estradi 1 Tablet(s) 1 Tablet(s) Oral daily  LORazepam   Injectable 2 milliGRAM(s) IV Push once PRN seizures  marijuana, medical 10 Capsule(s) Oral at bedtime  melatonin 3 milliGRAM(s) Oral at bedtime PRN Insomnia  metoprolol succinate ER 25 milliGRAM(s) Oral daily  ondansetron Injectable 4 milliGRAM(s) IV Push every 6 hours PRN Nausea and/or Vomiting    ALLERGIES:   Lupron Depot (Unknown)    ROS:  Negative for constitutional, skin, eyes, ENT, respiratory, cardiovascular, gastrointestinal, genitourinary, musculoskeletal, neurologic, psychiatric, hematology/lymphatics, endocrine, allergic/immunologic.      LAST 24-HR VITALS:  T(C): 36.9 (03-05-20 @ 16:49), Max: 37.1 (03-05-20 @ 05:26)  HR: 62 (03-05-20 @ 16:49) (53 - 89)  BP: 102/63 (03-05-20 @ 16:49) (96/66 - 119/76)  ABP: --  RR: 18 (03-05-20 @ 16:49) (17 - 18)  SpO2: 97% (03-05-20 @ 16:49) (97% - 99%)  CVP(cm H2O): --    GENERAL PHYSICAL EXAM:  GEN: no distress   HEENT:  NCAT, OP clear  EYES: sclera white, conjunctiva clear, no nystagmus  NECK: supple  CV: RRR, no murmur     		  PULM: CTAB, no wheezing  GI: soft ABD, +BS, NT  EXT: peripheral pulse intact, no cyanosis  MSK: muscle tone and strength normal  SKIN: warm, dry, no rash or lesion on exposed skin     NEUROLOGICAL EXAM:  Mental Status  Orientation: alert and oriented to person, place, time, and situation   Language: clear and fluent, intact comprehension and repetition, intact naming and reading    Cranial Nerves  II: full visual fields intact   III, IV, VI: PERRL, EOMI  V, VII: facial sensation and movement intact and symmetric   VIII: hearing intact   IX, X: uvula midline, soft palate elevates normally   XI: BL shoulder shrug intact   XII: tongue midline    Motor  5/5 BUE and BLE                 Tone and bulk are normal in upper and lower limbs  No pronator drift    Sensation  Intact to light touch and pinprick in all 4 EXTs    Reflex  2+ in BL biceps, triceps, brachioradialis, patella, ankle                                      Plantar responses downward bilaterally    Coordination  Normal FTN bilaterally    Gait  Deferred       LABS:                          11.6   6.96  )-----------( 210      ( 05 Mar 2020 05:56 )             34.3     03-05    138  |  103  |  8.0  ----------------------------<  81  4.4   |  23.0  |  0.87    Ca    8.9      05 Mar 2020 05:56  Mg     1.8     03-05          OTHER IMAGING AND STUDIES:    cvEEG 3/4 - 3/5/20: [facility]                                                                      Harley Cortez MD                                         Office: [facility] [address] [place], [place], [address]                                                 Phone: [phone]; Fax: [phone]                            ==============================================    EPILEPSY FOLLOW-UP NOTE      INTERVAL HISTORY:  Normal cvEEG, but recording didn't capture any seizure-like events. Feeling better today.    MEDICATIONS:   acetaminophen   Tablet .. 650 milliGRAM(s) Oral every 6 hours PRN Mild Pain (1 - 3)  aspirin  chewable 81 milliGRAM(s) Oral daily  Lillow (levonorgesterl and ethyl estradi 1 Tablet(s) 1 Tablet(s) Oral daily  LORazepam   Injectable 2 milliGRAM(s) IV Push once PRN seizures  marijuana, medical 10 Capsule(s) Oral at bedtime  melatonin 3 milliGRAM(s) Oral at bedtime PRN Insomnia  metoprolol succinate ER 25 milliGRAM(s) Oral daily  ondansetron Injectable 4 milliGRAM(s) IV Push every 6 hours PRN Nausea and/or Vomiting    ALLERGIES:   Lupron Depot (Unknown)    ROS:  Negative for constitutional, skin, eyes, ENT, respiratory, cardiovascular, gastrointestinal, genitourinary, musculoskeletal, neurologic, psychiatric, hematology/lymphatics, endocrine, allergic/immunologic.      LAST 24-HR VITALS:  T(C): 36.9 (03-05-20 @ 16:49), Max: 37.1 (03-05-20 @ 05:26)  HR: 62 (03-05-20 @ 16:49) (53 - 89)  BP: 102/63 (03-05-20 @ 16:49) (96/66 - 119/76)  ABP: --  RR: 18 (03-05-20 @ 16:49) (17 - 18)  SpO2: 97% (03-05-20 @ 16:49) (97% - 99%)  CVP(cm H2O): --    GENERAL PHYSICAL EXAM:  GEN: no distress   HEENT:  NCAT, OP clear  EYES: sclera white, conjunctiva clear, no nystagmus  NECK: supple  CV: RRR, no murmur     		  PULM: CTAB, no wheezing  GI: soft ABD, +BS, NT  EXT: peripheral pulse intact, no cyanosis  MSK: muscle tone and strength normal  SKIN: warm, dry, no rash or lesion on exposed skin     NEUROLOGICAL EXAM:  Mental Status  Orientation: alert and oriented to person, place, time, and situation   Language: clear and fluent, intact comprehension and repetition, intact naming and reading    Cranial Nerves  II: full visual fields intact   III, IV, VI: PERRL, EOMI  V, VII: facial sensation and movement intact and symmetric   VIII: hearing intact   IX, X: uvula midline, soft palate elevates normally   XI: BL shoulder shrug intact   XII: tongue midline    Motor  5/5 BUE and BLE                 Tone and bulk are normal in upper and lower limbs  No pronator drift    Sensation  Intact to light touch and pinprick in all 4 EXTs    Reflex  2+ in BL biceps, triceps, brachioradialis, patella, ankle                                      Plantar responses downward bilaterally    Coordination  Normal FTN bilaterally    Gait  Deferred       LABS:                          11.6   6.96  )-----------( 210      ( 05 Mar 2020 05:56 )             34.3     03-05    138  |  103  |  8.0  ----------------------------<  81  4.4   |  23.0  |  0.87    Ca    8.9      05 Mar 2020 05:56  Mg     1.8     03-05          OTHER IMAGING AND STUDIES:    cvEEG 3/4 - 3/5/20:  EEG SUMMARY/CLASSIFICATION    Normal EEG in the awake, drowsy and asleep states.    _____________________________________________________________  EEG IMPRESSION/CLINICAL CORRELATE    Normal EEG study.  No epileptiform pattern or seizure seen.

## 2020-03-05 NOTE — DISCHARGE NOTE PROVIDER - PROVIDER TOKENS
PROVIDER:[TOKEN:[[id number]],FOLLOWUP:[1 month]],FREE:[LAST:[Fu],FIRST:[Harley],PHONE:[phone]],FAX:[(   )    -],ADDRESS:[Neurology-Epilepsy   [facility]],FOLLOWUP:[1 week]]

## 2020-03-06 PROBLEM — N80.9 ENDOMETRIOSIS, UNSPECIFIED: Chronic | Status: ACTIVE | Noted: 2020-02-25

## 2020-03-06 PROBLEM — Z87.898 PERSONAL HISTORY OF OTHER SPECIFIED CONDITIONS: Chronic | Status: ACTIVE | Noted: 2020-02-25

## 2020-03-06 PROBLEM — F41.9 ANXIETY DISORDER, UNSPECIFIED: Chronic | Status: ACTIVE | Noted: 2020-02-25

## 2020-03-06 PROBLEM — A69.23 ARTHRITIS DUE TO LYME DISEASE: Chronic | Status: ACTIVE | Noted: 2020-02-25

## 2020-03-06 PROBLEM — I73.00 RAYNAUD'S SYNDROME WITHOUT GANGRENE: Chronic | Status: ACTIVE | Noted: 2020-02-25

## 2020-03-06 PROBLEM — M79.7 FIBROMYALGIA: Chronic | Status: ACTIVE | Noted: 2020-02-25

## 2020-03-06 PROBLEM — G47.00 INSOMNIA, UNSPECIFIED: Chronic | Status: ACTIVE | Noted: 2020-02-25

## 2020-03-11 ENCOUNTER — APPOINTMENT (OUTPATIENT)
Dept: NEUROLOGY | Facility: CLINIC | Age: 35
End: 2020-03-11
Payer: COMMERCIAL

## 2020-03-11 DIAGNOSIS — R56.9 UNSPECIFIED CONVULSIONS: ICD-10-CM

## 2020-03-11 DIAGNOSIS — R11.0 NAUSEA: ICD-10-CM

## 2020-03-11 PROCEDURE — 99215 OFFICE O/P EST HI 40 MIN: CPT

## 2020-03-17 NOTE — HISTORY OF PRESENT ILLNESS
[FreeTextEntry1] : PCP: Dr. 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 Pt today is accompanied by her mother-in-law.\par \par Pt presented to [facility] on 3/3 as an elective admission for EPS for SVT, and was diagnosed with AVNRT s/p RFA. Post op, pt had a convulsive seizure <1m. Saw the pt during the hospitalization. Upon further questioning, pt has had additional seizures and seizure-like events concerning for underlying epilepsy.\par \par About 12 yrs ago, patient was on Lupron for endometriosis, and had 3 bilateral tonic clonic seizures (2 within 24hrs, the other one 2-3 wks later). For all 3 seizures, patient remember feeling "off" the morning of, and whole-body tingling sensation progressing to BUE tonic flexion toward chest before LOC and convulsion. Lupron was discontinued. Few years later, began to have different seizure-like events as described below (type #2).\par \par Pt underwent cvEEG 3/4-3/5, which was unrevealing. But because of high suspicion for epilepsy, pt was discharged with titrating dose of LTG, which may also benefit pt in terms of anxiety.\par \par However, since being on LTG 25mg daily, pt reports tingling arounds eyes and in her hands, twitching and tremors in her extremities, xerostomia, nausea/dry heaving, tachycardia. She also noted that the catheter insertion site in her R groin is slightly more swollen than the L groin site. Denied fever, pain, warmth, erythema, discharge over the R groin catheter insertion site.\par \par SEIZURE/SPELL DESCRIPTION AND TYPE:\par Type #1\par Severity: bilateral tonic clonic seizure, unknown onset\par Onset: 2008\par Quality & associated signs/symptoms: whole-body tingling sensation -> BUE tonic flexion toward chest -> LOC, tonic-clonic movements\par Duration: few min\par Timing: 3 in 2008 (while on Lupron; 2 within 24hrs, the other one 2-3 wks later), 1 on 3/3/20 (post-op EPS)\par Modifying factors: triggered by Lupron and anesthetics? \par Diurnal Variation: none\par \par Type #2\par Severity: suspicious for focal aware and focal impaired awareness seizures\par Onset: mid 2010's\par Quality & associated signs/symptoms: Any of following presenting as isolated phenomena - intense tyler vu, tight knot or free fall sensation in abd, waking up at night with palpitation, metallic/gasoline smell. May be followed by nausea or impaired awareness for a few minutes.\par Duration: seconds to minutes\par Timing: few times a month (except palpitation is nightly)\par Modifying factors: unknown trigger  \par Diurnal Variation: none\par \par SEIZURE RISK FACTORS:\par Fell on cement pavement at [age] yo, needed stitches in forehead. Patient was a product of normal pregnancy and delivery. No history of febrile seizure, CNS infection, or FH of seizures.\par \par CURRENT AED:\par LTG 25mg daily – 3/2020\par \par PREVIOUS AED:\par none\par \par IMAGING: \par CTH 3/3/20 ([facility]): unremarkable\par \par NEUROPHYSIOLOGY:\par cvEEG 3/4-3/5/20 ([facility]): normal \par \par NEUROPSYCHOLOGY: \par none\par \par PMH:\par fibromyalgia, endometriosis, lyme disease with residual joint pain, undifferentiated connective tissue disorder, anxiety, AVNRT s/p RFA\par \par PSH:\par History of removal of cyst: scalp X 2\par H/O oral surgery\par H/O laparoscopy: endometriosis\par RFA for AVNRT 3/3/20\par \par ALLERGIES:\par Lupron – convulsive sz (unclear if true allergic reaction or pt has underlying epilepsy)\par \par FH:\par non-contributory\par \par SH:\par No E/T. Intermittently uses CBD oil for anxiety. Two children. 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 On oral contraceptives; no plan for more kids.

## 2020-03-17 NOTE — ASSESSMENT
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  \par \par Unclear whether pt has underlying epilepsy or acute symptomatic seizures. Reported lots of symptoms including tingling around eyes and hands, twitching and tremors in extremities, xerostomia, nausea/dry heaving and tachycardia since starting LTG 25mg daily. Suspect medication side effect. But if sx's persist following discontinuation, will admit to Epilepsy Monitoring Unit (EMU) for further characterization of these symptoms. Patient was educated regarding risks and driving privileges associated with the [facility] Guidelines; patient instructed not to drive for now. All questions and concerns answered and addressed in detail to patient's complete satisfaction. Patient verbalized understanding and agreed to plan.\par \par \par - DC LTG 25mg daily\par - call me next Monday to report if symptoms persist after LTG discontinuation: admit to EMU if sx's persist (prefers Tue admission)\par - MRI brain w/o, epilepsy protocol\par - R groin catheter-insertion site slightly more edematous than the L, without s/s of infection. Asked pt to f/u PCP and cardiology who performed the EPS.\par - on contraceptives\par - no driving for now\par - f/u in 3 months\par \par \par More than 50% of time spent on counseling and educating patient on differential, workup, disease course, and treatment/management. All questions and concerns answered and addressed in detail to patient's complete satisfaction. Patient verbalized understanding and agreed to plan.

## 2020-03-17 NOTE — CONSULT LETTER
[Dear  ___] : Dear  [unfilled], [Sincerely,] : Sincerely, [FreeTextEntry1] : I had the pleasure of seeing your patient, FLASH SWEENEY, in my office today. Please see my note below. \par \par If you have any questions, please do not hesitate to contact me.\par  [FreeTextEntry3] : Harley Cortez MD\par [facility]\par [facility] [place]\par [address] [place] [address]\par Phone: [phone]; Fax: [phone]\par \par

## 2020-03-17 NOTE — REASON FOR VISIT
[Consultation] : a consultation visit [Family Member] : family member [FreeTextEntry1] : convulsion, seizure-like activity

## 2020-03-17 NOTE — PHYSICAL EXAM
[FreeTextEntry1] : GENERAL PHYSICAL EXAM:\par GEN: no distress, normal affect\par HEENT: NCAT, OP clear\par EYES: sclera white, conjunctiva clear, no nystagmus\par NECK: supple\par CV: RRR    		\par PULM: CTAB, no wheezing\par GI: soft ABD, +BS, NT, ND\par EXT: peripheral pulse intact, no cyanosis\par MSK: muscle tone and strength normal\par SKIN: BL groin catheter-insertion sites are dry, the R is mildly more edematous than L, but no warmth/discharge/erythema\par \par NEUROLOGICAL EXAM:\par Mental Status\par Orientation: alert and oriented to person, place, time, and situation \par Language: clear and fluent, intact comprehension and repetition, intact naming and reading\par \par Cranial Nerves\par II: full visual fields intact \par III, IV, VI: PERRL, EOMI\par V, VII: facial sensation and movement intact and symmetric \par VIII: hearing intact \par IX, X: uvula midline, soft palate elevates normally \par XI: BL shoulder shrug intact \par XII: tongue midline\par \par Motor\par Shoulder abd: 5 (R), 5 (L)\par EF/EE: 5 (R), 5 (L)\par WF/WE: 5 (R), 5 (L)\par hand : 5 (R), 5 (L)\par HF/HE: 5 (R), 5 (L)\par KF/KE: 5 (R), 5 (L)\par DF/PF: 5 (R), 5 (L)                \par Tone and bulk are normal in upper and lower limbs\par No pronator drift\par \par Sensation\par Intact to light touch and pinprick in all 4 EXTs\par \par Reflex\par 2+ in BL biceps, triceps, brachioradialis, patella, ankle                                    \par Plantar responses downward bilaterally\par \par Coordination\par Normal FTN bilaterally\par \par Gait\par Normal stance, stride, and pivot turn\par Tandem walk intact\par Negative Romberg\par

## 2020-05-28 ENCOUNTER — APPOINTMENT (OUTPATIENT)
Dept: ELECTROPHYSIOLOGY | Facility: CLINIC | Age: 35
End: 2020-05-28
Payer: COMMERCIAL

## 2020-05-28 VITALS
DIASTOLIC BLOOD PRESSURE: 82 MMHG | BODY MASS INDEX: 19.14 KG/M2 | HEART RATE: 76 BPM | HEIGHT: 62 IN | WEIGHT: 104 LBS | SYSTOLIC BLOOD PRESSURE: 111 MMHG

## 2020-05-28 PROCEDURE — 99213 OFFICE O/P EST LOW 20 MIN: CPT | Mod: 95

## 2020-05-28 RX ORDER — LAMOTRIGINE 25 MG/1
25 TABLET ORAL
Qty: 200 | Refills: 0 | Status: DISCONTINUED | COMMUNITY
Start: 2020-03-05 | End: 2020-05-28

## 2020-05-28 NOTE — DISCUSSION/SUMMARY
[FreeTextEntry1] : Time Initiated: 09:52 am\par \par ms. Nevarez is a 34 year old female with history of Lyme disease w/chronic joint pain, Raynaud's\par syndrome, fibromyalgia, suspected SLE and long standing history of palpitations, who presents today for EP follow up via telehealth s/p s/p successful EPS and RFA of AVNRT on 3/3/20. Her hospital course was complicated by seizure-like events for which she was evaluated by neurology. She has continued neurology follow up and reports she has been free of seizure like activity since discontinuation of LTG.  \par \par Since procedure she reports a significant improvement in palpitations. Reports only brief mild palpitations on occasion, not associated with shortness of breath, dizziness, or near syncope as she had felt prior to ablation. Prior to ablation she reports her symptoms were debilitating, and now she is unaffected by mild palpitations, they do not hinder her ability to sleep at night, or ability to complete ADL's without difficuly. \par \par Recommendation: \par \par Will obtain 2 week Zio patch for symptom rhythm correlation.  To continue weaning metoprolol to 12.5 mg QHS with hopes of discontinuing in future. To return for EP follow up in 3 months. \par \par Anitha Ruff ANP-C

## 2020-05-28 NOTE — REVIEW OF SYSTEMS
[Palpitations] : palpitations [Dyspnea on exertion] : not dyspnea during exertion [Shortness Of Breath] : no shortness of breath [Chest  Pressure] : no chest pressure [Chest Pain] : no chest pain [Lower Ext Edema] : no extremity edema [Cough] : no cough [Dizziness] : no dizziness

## 2020-05-28 NOTE — HISTORY OF PRESENT ILLNESS
[Home] : at home, [unfilled] , at the time of the visit. [Medical Office: ([facility])___] : at the medical office located in  [Verbal consent obtained from patient] : the patient, [unfilled] [FreeTextEntry1] : Time Initiated: 09:52 am\par \par ms. Nevarez is a 34 year old female with history of Lyme disease w/chronic joint pain, Raynaud's\par syndrome, fibromyalgia, suspected SLE and long standing history of palpitations, who presents today for EP follow up via telehealth s/p s/p successful EPS and RFA of AVNRT on 3/3/20. Her hospital course was complicated by seizure-like events for which she was evaluated by neurology. She has continued neurology follow up and reports she has been free of seizure like activity since discontinuation of LTG.  \par \par Since procedure she reports a significant improvement in palpitations. Reports only brief mild palpitations on occasion, not associated with shortness of breath, dizziness, or near syncope as she had felt prior to ablation. Prior to ablation she reports her symptoms were debilitating, and now she is unaffected by mild palpitations, they do not hinder her ability to sleep at night, or ability to complete ADL's without difficuly. \par  [FreeTextEntry4] : ADI Van

## 2020-06-03 ENCOUNTER — APPOINTMENT (OUTPATIENT)
Dept: NEUROLOGY | Facility: CLINIC | Age: 35
End: 2020-06-03
Payer: COMMERCIAL

## 2020-06-03 PROCEDURE — 99214 OFFICE O/P EST MOD 30 MIN: CPT | Mod: 95

## 2020-06-04 NOTE — HISTORY OF PRESENT ILLNESS
[Home] : at home, [unfilled] , at the time of the visit. [Other Location: e.g. Home (Enter Location, City,State)___] : at [unfilled] [Verbal consent obtained from patient] : the patient, [unfilled] [FreeTextEntry1] : LAST CLINIC VISIT: 3/11/20\par \par PCP: Dr. Youngblood\par \par INTERIM HISTORY:\par Doing well. No sx of COVID. 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 Pt today is accompanied by her mother-in-law.\par \par Pt presented to [facility] on 3/3 as an elective admission for EPS for SVT, and was diagnosed with AVNRT s/p RFA. Post op, pt had a convulsive seizure <1m. Saw the pt during the hospitalization. Upon further questioning, pt has had additional seizures and seizure-like events concerning for underlying epilepsy.\par \par About 12 yrs ago, patient was on Lupron for endometriosis, and had 3 bilateral tonic clonic seizures (2 within 24hrs, the other one 2-3 wks later). For all 3 seizures, patient remember feeling "off" the morning of, and whole-body tingling sensation progressing to BUE tonic flexion toward chest before LOC and convulsion. Lupron was discontinued. Few years later, began to have different seizure-like events as described below (type #2).\par \par Pt underwent cvEEG 3/4-3/5, which was unrevealing. But because of high suspicion for epilepsy, pt was discharged with titrating dose of LTG, which may also benefit pt in terms of anxiety.\par \par However, since being on LTG 25mg daily, pt reports tingling arounds eyes and in her hands, twitching and tremors in her extremities, xerostomia, nausea/dry heaving, tachycardia. She also noted that the catheter insertion site in her R groin is slightly more swollen than the L groin site. Denied fever, pain, warmth, erythema, discharge over the R groin catheter insertion site. CURRENT AED: LTG 25mg daily – 3/2020.\par \par \par SEIZURE/SPELL DESCRIPTION AND TYPE:\par Type #1\par Severity: bilateral tonic clonic seizure, unknown onset\par Onset: 2008\par Quality & associated signs/symptoms: whole-body tingling sensation -> BUE tonic flexion toward chest -> LOC, tonic-clonic movements\par Duration: few min\par Timing: 3 in 2008 (while on Lupron; 2 within 24hrs, the other one 2-3 wks later), 1 on 3/3/20 (post-op EPS)\par Modifying factors: triggered by Lupron and anesthetics? \par Diurnal Variation: none\par \par Type #2\par Severity: suspicious for focal aware and focal impaired awareness seizures\par Onset: mid 2010's\par Quality & associated signs/symptoms: Any of following presenting as isolated phenomena - intense tyler vu, tight knot or free fall sensation in abd, waking up at night with palpitation, metallic/gasoline smell. May be followed by nausea or impaired awareness for a few minutes.\par Duration: seconds to minutes\par Timing: few times a month (except palpitation is nightly)\par Modifying factors: unknown trigger  \par Diurnal Variation: none\par \par SEIZURE RISK FACTORS:\par Fell on cement pavement at [age] yo, needed stitches in forehead. Patient was a product of normal pregnancy and delivery. No history of febrile seizure, CNS infection, or FH of seizures.\par \par PREVIOUS AED:\par LTG 25mg daily – very briefly 3/2020, tingling arounds eyes and in her hands, twitching and tremors in her extremities, xerostomia, nausea/dry heaving\par \par IMAGING: \par CTH 3/3/20 ([facility]): unremarkable\par \par NEUROPHYSIOLOGY:\par cvEEG 3/4-3/5/20 ([facility]): normal \par \par NEUROPSYCHOLOGY: \par none\par \par SH:\par Two children. 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

## 2020-06-04 NOTE — ASSESSMENT
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  \par \par Unclear whether pt has underlying epilepsy or acute symptomatic seizures. Plan to admit to Epilepsy Monitoring Unit (EMU) for further workup in August. All questions and concerns answered and addressed in detail to patient's complete satisfaction. Patient verbalized understanding and agreed to plan.\par \par \par - still pending MRI brain w/o, epilepsy protocol\par - plan for EMU in August\par - on contraceptives\par - f/u in 1 month\par \par \par More than 50% of time spent on counseling and educating patient on differential, workup, disease course, and treatment/management. All questions and concerns answered and addressed in detail to patient's complete satisfaction. Patient verbalized understanding and agreed to plan.\par \par \par Patient consented to the teleservice as stated below. Verbal consent given on 06/03/2020 by patient, FLASH SWEENEY.\par \par Informed Consent for Telehealth Services During a Public Health Emergency\par \par By virtue of my participation in this telehealth visit, I am consenting to receive care through telehealth. Telehealth is the use of electronic information and communication technologies by providers to deliver health care to patients at a distance.\par \par I understand that any care provided to me through [employer]s telehealth application (“the [employer]”) will incorporate security protocols to protect the privacy and security of my health information. If any other application is used to provide care to me, I understand that the technology may not contain appropriate security protocols to protect the privacy and security of my health information. My provider has explained to me the risks associated with the technology platforms that he or she is using to provide care to me. I acknowledge that there are potential risks associated with any technology used while obtaining care through telehealth, including, but not limited to, connectively interruptions, other technical difficulties, and unauthorized access by a third party to one’s health information. Despite these risks, I agree to participate in the telehealth encounter.\par \par I understand and agree that I or my healthcare provider may terminate a telehealth encounter at any time in the event of a technical malfunction.\par \par I also understand that my location determines where medicine is being practiced. As a result, I will inform my provider where I am located at the time of my telehealth visit.\par \par I understand that there may be costs associated with a telehealth visit. I agree that I am responsible for any fees associated with the telehealth services that I receive.\par \par This Informed Consent for Telehealth Services During a Public Health Emergency will remain in effect solely during the term of the public health emergency.\par \par By signing below I certify that:\par \par I have read or had this form read and/or had this form explained to me;\par \par I fully understand the contents of this document, including the risks and benefits of receiving telehealth services; and\par \par I have been given ample opportunity to discuss any questions I may have regarding the telehealth services and that all of my questions have been answered to my satisfaction

## 2020-07-09 ENCOUNTER — APPOINTMENT (OUTPATIENT)
Dept: NEUROLOGY | Facility: CLINIC | Age: 35
End: 2020-07-09

## 2020-08-03 ENCOUNTER — APPOINTMENT (OUTPATIENT)
Dept: MRI IMAGING | Facility: CLINIC | Age: 35
End: 2020-08-03
Payer: COMMERCIAL

## 2020-08-03 ENCOUNTER — OUTPATIENT (OUTPATIENT)
Dept: OUTPATIENT SERVICES | Facility: HOSPITAL | Age: 35
LOS: 1 days | End: 2020-08-03

## 2020-08-03 DIAGNOSIS — Z00.8 ENCOUNTER FOR OTHER GENERAL EXAMINATION: ICD-10-CM

## 2020-08-03 DIAGNOSIS — Z98.890 OTHER SPECIFIED POSTPROCEDURAL STATES: Chronic | ICD-10-CM

## 2020-08-03 PROCEDURE — 70551 MRI BRAIN STEM W/O DYE: CPT | Mod: 26

## 2020-08-10 ENCOUNTER — APPOINTMENT (OUTPATIENT)
Dept: NEUROLOGY | Facility: CLINIC | Age: 35
End: 2020-08-10
Payer: COMMERCIAL

## 2020-08-10 VITALS
DIASTOLIC BLOOD PRESSURE: 70 MMHG | SYSTOLIC BLOOD PRESSURE: 104 MMHG | HEIGHT: 62 IN | WEIGHT: 100 LBS | TEMPERATURE: 98.7 F | BODY MASS INDEX: 18.4 KG/M2 | HEART RATE: 76 BPM | OXYGEN SATURATION: 97 %

## 2020-08-10 PROCEDURE — 99214 OFFICE O/P EST MOD 30 MIN: CPT

## 2020-08-10 NOTE — ASSESSMENT
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  \par \par Unclear whether pt has underlying epilepsy or acute symptomatic seizures. Plan to admit to Epilepsy Monitoring Unit (EMU) for further workup and better characterization of sz-like events. All questions and concerns answered and addressed in detail to patient's complete satisfaction. Patient verbalized understanding and agreed to plan.\par \par \par - Admit to EMU. The purposes of the EMU admission is to 1) establish the first diagnosis of a seizure disorder, which is medically necessary to guide clinical management and select the most appropriate therapeutic regimen, and 2) to differentiate epileptic from nonepileptic events. The expected length of stay is 3-4 days.\par \par - on contraceptives\par - f/u after EMU\par \par \par More than 50% of time spent on counseling and educating patient on differential, workup, disease course, and treatment/management. All questions and concerns answered and addressed in detail to patient's complete satisfaction. Patient verbalized understanding and agreed to plan.

## 2020-08-10 NOTE — HISTORY OF PRESENT ILLNESS
[FreeTextEntry1] : LAST CLINIC VISIT: 6/3/20\par \par PCP: Dr. Youngblood\par \par INTERIM HISTORY:\par MRI 8/3/20 normal. 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 Pt today is accompanied by her mother-in-law.\par \par Pt presented to [facility] on 3/3 as an elective admission for EPS for SVT, and was diagnosed with AVNRT s/p RFA. Post op, pt had a convulsive seizure <1m. Saw the pt during the hospitalization. Upon further questioning, pt has had additional seizures and seizure-like events concerning for underlying epilepsy.\par \par About 12 yrs ago, patient was on Lupron for endometriosis, and had 3 bilateral tonic clonic seizures (2 within 24hrs, the other one 2-3 wks later). For all 3 seizures, patient remember feeling "off" the morning of, and whole-body tingling sensation progressing to BUE tonic flexion toward chest before LOC and convulsion. Lupron was discontinued. Few years later, began to have different seizure-like events as described below (type #2).\par \par Pt underwent cvEEG 3/4-3/5, which was unrevealing. But because of high suspicion for epilepsy, pt was discharged with titrating dose of LTG, which may also benefit pt in terms of anxiety.\par \par However, since being on LTG 25mg daily, pt reports tingling arounds eyes and in her hands, twitching and tremors in her extremities, xerostomia, nausea/dry heaving, tachycardia. She also noted that the catheter insertion site in her R groin is slightly more swollen than the L groin site. Denied fever, pain, warmth, erythema, discharge over the R groin catheter insertion site. CURRENT AED: LTG 25mg daily – 3/2020.\par \par === 6/3/20 ===\par Doing well. No sx of COVID. No further seizure-like activity. CURRENT AED: none.\par \par \par SEIZURE/SPELL DESCRIPTION AND TYPE:\par Type #1\par Severity: bilateral tonic clonic seizure, unknown onset\par Onset: 2008\par Quality & associated signs/symptoms: whole-body tingling sensation -> BUE tonic flexion toward chest -> LOC, tonic-clonic movements\par Duration: few min\par Timing: 3 in 2008 (while on Lupron; 2 within 24hrs, the other one 2-3 wks later), 1 on 3/3/20 (post-op EPS)\par Modifying factors: triggered by Lupron and anesthetics? \par Diurnal Variation: none\par \par Type #2\par Severity: suspicious for focal aware and focal impaired awareness seizures\par Onset: mid 2010's\par Quality & associated signs/symptoms: Any of following presenting as isolated phenomena - intense tyler vu, tight knot or free fall sensation in abd, waking up at night with palpitation, metallic/gasoline smell. May be followed by nausea or impaired awareness for a few minutes.\par Duration: seconds to minutes\par Timing: few times a month (except palpitation is nightly)\par Modifying factors: unknown trigger  \par Diurnal Variation: none\par \par SEIZURE RISK FACTORS:\par Fell on cement pavement at [age] yo, needed stitches in forehead. Patient was a product of normal pregnancy and delivery. No history of febrile seizure, CNS infection, or FH of seizures.\par \par PREVIOUS AED:\par LTG 25mg daily – very briefly 3/2020, tingling arounds eyes and in her hands, twitching and tremors in her extremities, xerostomia, nausea/dry heaving\par \par IMAGING: \par MRI w/o 8/3/20 ([place]): normal\par CTH 3/3/20 ([facility]): unremarkable\par \par NEUROPHYSIOLOGY:\par cvEEG 3/4-3/5/20 ([facility]): normal \par \par NEUROPSYCHOLOGY: \par none\par \par SH:\par Two children. 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

## 2020-08-10 NOTE — CONSULT LETTER
[Sincerely,] : Sincerely, [Dear  ___] : Dear  [unfilled], [FreeTextEntry1] : I had the pleasure of seeing your patient, FLASH SWEENEY, in my office today. Please see my note below. \par \par If you have any questions, please do not hesitate to contact me.  [FreeTextEntry3] : Harley Cortez MD\par [facility]\par [facility] [place]\par [address] [place] [address]\par Phone: [phone]; Fax: [phone]\par \par

## 2020-08-17 ENCOUNTER — APPOINTMENT (OUTPATIENT)
Dept: OBGYN | Facility: CLINIC | Age: 35
End: 2020-08-17
Payer: COMMERCIAL

## 2020-08-17 VITALS
DIASTOLIC BLOOD PRESSURE: 68 MMHG | SYSTOLIC BLOOD PRESSURE: 106 MMHG | RESPIRATION RATE: 16 BRPM | OXYGEN SATURATION: 97 % | TEMPERATURE: 98 F | WEIGHT: 100 LBS | HEIGHT: 62 IN | BODY MASS INDEX: 18.4 KG/M2 | HEART RATE: 72 BPM

## 2020-08-17 DIAGNOSIS — Z30.09 ENCOUNTER FOR OTHER GENERAL COUNSELING AND ADVICE ON CONTRACEPTION: ICD-10-CM

## 2020-08-17 PROCEDURE — 99385 PREV VISIT NEW AGE 18-39: CPT | Mod: 25

## 2020-08-17 NOTE — PHYSICAL EXAM
[Awake] : awake [Alert] : alert [Mass] : no breast mass [Acute Distress] : no acute distress [Axillary LAD] : no axillary lymphadenopathy [Nipple Discharge] : no nipple discharge [Tender] : non tender [Soft] : soft [Oriented x3] : oriented to person, place, and time [Normal] : cervix [No Bleeding] : there was no active vaginal bleeding [Pap Obtained] : a Pap smear was performed [Uterine Adnexae] : were not tender and not enlarged

## 2020-08-17 NOTE — COUNSELING
[Breast Self Exam] : breast self exam [Exercise] : exercise [Nutrition] : nutrition [Vitamins/Supplements] : vitamins/supplements [Contraception] : contraception [Medication Management] : medication management [Pain Management] : pain management

## 2020-08-17 NOTE — CHIEF COMPLAINT
[Initial Visit] : initial GYN visit [FreeTextEntry1] : Patient is a 34-year-old female that presents for a annual gynecologic examination and has multiple complaints. Patient with a history of endometriosis to prior endometriosis laparoscopies. Patient has pelvic discomfort as well as dysmenorrhea. Patient also would like to have some form of contraception. Patient is turning 35 and 2 months. The patient is a smoker of half pack of cigarettes per day. Advised patient to have this complete exam today and to have a pelvic MRI. And will have a followup consultation to discuss test results and treatment options taking into consideration her personal history of endometriosis and current symptomatology. As far as contraception is concerned in view of patient's smoking and medical history advised patient to consider a kyleena hormonal IUD. Patient is agreeable. This benefits and alternatives reviewed.

## 2020-08-18 LAB — HPV HIGH+LOW RISK DNA PNL CVX: NOT DETECTED

## 2020-09-01 ENCOUNTER — APPOINTMENT (OUTPATIENT)
Dept: MRI IMAGING | Facility: CLINIC | Age: 35
End: 2020-09-01

## 2020-09-10 ENCOUNTER — APPOINTMENT (OUTPATIENT)
Dept: ELECTROPHYSIOLOGY | Facility: CLINIC | Age: 35
End: 2020-09-10
Payer: COMMERCIAL

## 2020-09-10 VITALS
WEIGHT: 100 LBS | SYSTOLIC BLOOD PRESSURE: 111 MMHG | OXYGEN SATURATION: 98 % | BODY MASS INDEX: 18.4 KG/M2 | DIASTOLIC BLOOD PRESSURE: 73 MMHG | HEART RATE: 76 BPM | HEIGHT: 62 IN | TEMPERATURE: 98.9 F

## 2020-09-10 DIAGNOSIS — R00.0 TACHYCARDIA, UNSPECIFIED: ICD-10-CM

## 2020-09-10 PROCEDURE — 93000 ELECTROCARDIOGRAM COMPLETE: CPT

## 2020-09-10 PROCEDURE — 99213 OFFICE O/P EST LOW 20 MIN: CPT

## 2020-09-10 RX ORDER — NAPROXEN 500 MG/1
500 TABLET ORAL TWICE DAILY
Refills: 0 | Status: DISCONTINUED | COMMUNITY
End: 2020-09-10

## 2020-09-10 RX ORDER — LEVONORGESTREL AND ETHINYL ESTRADIOL 150-30(84)
0.15-0.03 &0.01 KIT ORAL DAILY
Refills: 0 | Status: DISCONTINUED | COMMUNITY
End: 2020-09-10

## 2020-09-10 RX ORDER — METOPROLOL SUCCINATE 25 MG/1
25 TABLET, EXTENDED RELEASE ORAL DAILY
Qty: 45 | Refills: 0 | Status: DISCONTINUED | COMMUNITY
End: 2020-09-10

## 2020-09-10 RX ORDER — ALPRAZOLAM 0.25 MG/1
0.25 TABLET ORAL
Refills: 0 | Status: DISCONTINUED | COMMUNITY
End: 2020-09-10

## 2020-09-10 RX ORDER — LEVONORGESTREL AND ETHINYL ESTRADIOL AND ETHINYL ESTRADIOL 0.15MG(84)
42-21-21-7 KIT ORAL DAILY
Refills: 0 | Status: DISCONTINUED | COMMUNITY
End: 2020-09-10

## 2020-09-10 RX ORDER — LEVONORGESTREL AND ETHINYL ESTRADIOL 0.15-0.03
0.15-3 KIT ORAL
Qty: 28 | Refills: 0 | Status: DISCONTINUED | COMMUNITY
Start: 2020-01-07 | End: 2020-09-10

## 2020-09-10 RX ORDER — METOPROLOL SUCCINATE 25 MG/1
25 TABLET, EXTENDED RELEASE ORAL
Qty: 90 | Refills: 1 | Status: DISCONTINUED | COMMUNITY
End: 2020-09-10

## 2020-09-10 NOTE — PHYSICAL EXAM
[General Appearance - Well Developed] : well developed [General Appearance - Well Nourished] : well nourished [] : no respiratory distress [Respiration, Rhythm And Depth] : normal respiratory rhythm and effort [Heart Rate And Rhythm] : heart rate and rhythm were normal [Auscultation Breath Sounds / Voice Sounds] : lungs were clear to auscultation bilaterally [Heart Sounds] : normal S1 and S2 [Murmurs] : no murmurs present [Abdomen Soft] : soft [Edema] : no peripheral edema present [Arterial Pulses Normal] : the arterial pulses were normal [Abnormal Walk] : normal gait [Oriented To Time, Place, And Person] : oriented to person, place, and time

## 2020-09-10 NOTE — REVIEW OF SYSTEMS
[Fever] : no fever [Headache] : no headache [Feeling Fatigued] : not feeling fatigued [Shortness Of Breath] : no shortness of breath [Dyspnea on exertion] : not dyspnea during exertion [Chest  Pressure] : no chest pressure [Chest Pain] : no chest pain [Palpitations] : palpitations [Dizziness] : no dizziness [Lower Ext Edema] : no extremity edema [Easy Bleeding] : no tendency for easy bleeding [Easy Bruising] : no tendency for easy bruising

## 2020-09-10 NOTE — HISTORY OF PRESENT ILLNESS
[FreeTextEntry1] : Ms. Nevarez is a 34 year old female with history of Lyme disease w/chronic joint pain, Raynaud's\par syndrome, fibromyalgia, suspected SLE and long standing history of palpitations, who presents today for EP follow up s/p successful EPS and RFA of AVNRT on 3/3/20. Her hospital course was complicated by seizure-like events for which she was evaluated by neurology. She has continued neurology follow up and reports she has been free of seizure like activity since discontinuation of LTG. \par \par Since procedure she reports a significant improvement in palpitations. Reports only brief mild palpitations on occasion, not associated with shortness of breath, dizziness, or near syncope as she had felt prior to ablation. Since last follow up she attempted to wean metoprlolol to 12.5mg daily but she did have an increase in palpitations. Event monitoring worn 6/5/20-6/19/20 revealed SR with rare ectopy < 1%. Of note she was recently diagnosed with SLE, for which she is going to begin treatment with hydroxychloroquine. \par

## 2020-09-10 NOTE — DISCUSSION/SUMMARY
[FreeTextEntry1] : Ms. Nevarez is a 34 year old female with history of Lyme disease w/chronic joint pain, Raynaud's\par syndrome, fibromyalgia, suspected SLE and long standing history of palpitations, who presents today for EP follow up s/p successful EPS and RFA of AVNRT on 3/3/20. Her hospital course was complicated by seizure-like events for which she was evaluated by neurology. She has continued neurology follow up and reports she has been free of seizure like activity since discontinuation of LTG. \par \par Since procedure she reports a significant improvement in palpitations. Reports only brief mild palpitations on occasion, not associated with shortness of breath, dizziness, or near syncope as she had felt prior to ablation. Since last follow up she attempted to wean metoprlolol to 12.5 mg daily but she did have an increase in palpitations. Event monitoring worn 6/5/20-6/19/20 revealed SR with rare ectopy < 1%. Of note she was recently diagnosed with SLE, for which she is going to begin treatment with hydroxychloroquine. \par \par Recommendation:\par \par -To resume metoprolol 25 mg daily for symptom relief of low burden ectopy. She will be initiating SLE treatment this week, advised if she notes fatigue or any side effects from metoprolol then we can arrange monitoring during weaning of medication. \par - If continues to feel well EP follow up 6 months.\par \par Anitha Ruff ANP-C \par \par

## 2020-09-18 ENCOUNTER — OUTPATIENT (OUTPATIENT)
Dept: OUTPATIENT SERVICES | Facility: HOSPITAL | Age: 35
LOS: 1 days | End: 2020-09-18
Payer: COMMERCIAL

## 2020-09-18 ENCOUNTER — APPOINTMENT (OUTPATIENT)
Dept: MRI IMAGING | Facility: CLINIC | Age: 35
End: 2020-09-18
Payer: COMMERCIAL

## 2020-09-18 DIAGNOSIS — Z98.890 OTHER SPECIFIED POSTPROCEDURAL STATES: Chronic | ICD-10-CM

## 2020-09-18 DIAGNOSIS — Z87.42 PERSONAL HISTORY OF OTHER DISEASES OF THE FEMALE GENITAL TRACT: ICD-10-CM

## 2020-09-18 DIAGNOSIS — Z00.8 ENCOUNTER FOR OTHER GENERAL EXAMINATION: ICD-10-CM

## 2020-09-18 PROCEDURE — 72197 MRI PELVIS W/O & W/DYE: CPT

## 2020-09-18 PROCEDURE — 72197 MRI PELVIS W/O & W/DYE: CPT | Mod: 26

## 2020-09-18 PROCEDURE — A9585: CPT

## 2020-09-21 ENCOUNTER — APPOINTMENT (OUTPATIENT)
Dept: OBGYN | Facility: CLINIC | Age: 35
End: 2020-09-21
Payer: COMMERCIAL

## 2020-09-21 DIAGNOSIS — Z30.430 ENCOUNTER FOR INSERTION OF INTRAUTERINE CONTRACEPTIVE DEVICE: ICD-10-CM

## 2020-09-21 LAB
HCG UR QL: NEGATIVE
QUALITY CONTROL: YES

## 2020-09-21 PROCEDURE — 58300 INSERT INTRAUTERINE DEVICE: CPT

## 2020-09-21 PROCEDURE — 64435 NJX AA&/STRD PARACRV NRV: CPT

## 2020-09-21 PROCEDURE — 81025 URINE PREGNANCY TEST: CPT

## 2020-09-21 NOTE — PROCEDURE
[IUD Placement] : intrauterine device (IUD) placement [Time out performed] : Pre-procedure time out performed.  Patient's name, date of birth and procedure confirmed. [Consent Obtained] : Consent obtained [Prevention of Pregnancy] : prevention of pregnancy [Risks] : risks [Benefits] : benefits [Alternatives] : alternatives [Patient] : patient [Infection] : infection [Bleeding] : bleeding [Pain] : pain [Expulsion] : expulsion [Failure] : failure [Uterine Perforation] : uterine perforation [Neg Pregnancy Test] : negative pregnancy test [No Premedication] : No premedication [Betadine] : Betadine [Tenaculum] : Tenaculum [Required Dilation] : required dilation [Sounded to ___ cm] : sounded to [unfilled] ~Ucm [Kyleena IUD] : Kyleena IUD [Tolerated Well] : Patient tolerated the procedure well [No Complications] : No complications [Motrin/Ibuprofen] : Motrin/Ibuprofen [de-identified] : 9/21/25

## 2020-10-19 ENCOUNTER — APPOINTMENT (OUTPATIENT)
Dept: OBGYN | Facility: CLINIC | Age: 35
End: 2020-10-19
Payer: COMMERCIAL

## 2020-10-19 PROCEDURE — 99213 OFFICE O/P EST LOW 20 MIN: CPT | Mod: 25

## 2020-10-19 PROCEDURE — 76830 TRANSVAGINAL US NON-OB: CPT

## 2020-10-19 NOTE — HISTORY OF PRESENT ILLNESS
[FreeTextEntry1] : Patient is a 34-year-old female who is one month status post insertion of a kyleena IUD. Patient has no complaints

## 2020-10-19 NOTE — PHYSICAL EXAM
[Labia Majora] : normal [Labia Minora] : normal [IUD String] : an IUD string was noted [Uterine Adnexae] : normal [Normal] : normal

## 2020-10-19 NOTE — PROCEDURE
[Locate IUD] : locate IUD [Transvaginal Ultrasound] : transvaginal ultrasound [FreeTextEntry3] : Transvaginal pelvic ultrasound performed: An IUD is seen in the proper position placement within the endometrial cavity and is otherwise within normal limit

## 2021-01-22 ENCOUNTER — APPOINTMENT (OUTPATIENT)
Dept: OBGYN | Facility: CLINIC | Age: 36
End: 2021-01-22
Payer: COMMERCIAL

## 2021-01-22 VITALS
DIASTOLIC BLOOD PRESSURE: 60 MMHG | HEIGHT: 62 IN | BODY MASS INDEX: 19.51 KG/M2 | SYSTOLIC BLOOD PRESSURE: 110 MMHG | WEIGHT: 106 LBS

## 2021-01-22 DIAGNOSIS — Z97.5 EXCESSIVE AND FREQUENT MENSTRUATION WITH IRREGULAR CYCLE: ICD-10-CM

## 2021-01-22 DIAGNOSIS — Z87.42 PERSONAL HISTORY OF OTHER DISEASES OF THE FEMALE GENITAL TRACT: ICD-10-CM

## 2021-01-22 DIAGNOSIS — R10.2 PELVIC AND PERINEAL PAIN: ICD-10-CM

## 2021-01-22 DIAGNOSIS — N92.1 EXCESSIVE AND FREQUENT MENSTRUATION WITH IRREGULAR CYCLE: ICD-10-CM

## 2021-01-22 PROCEDURE — 99072 ADDL SUPL MATRL&STAF TM PHE: CPT

## 2021-01-22 PROCEDURE — 99213 OFFICE O/P EST LOW 20 MIN: CPT

## 2021-01-23 PROBLEM — Z87.42 PERSONAL HISTORY OF ENDOMETRIOSIS: Status: RESOLVED | Noted: 2020-08-17 | Resolved: 2021-01-23

## 2021-01-23 PROBLEM — R10.2 PELVIC PAIN IN FEMALE: Status: RESOLVED | Noted: 2021-01-22 | Resolved: 2021-01-23

## 2021-01-23 PROBLEM — N92.1 BREAKTHROUGH BLEEDING WITH IUD: Status: ACTIVE | Noted: 2021-01-23

## 2021-01-23 NOTE — HISTORY OF PRESENT ILLNESS
[FreeTextEntry1] : Patient is a 35-year-old female presents for 3 months followup status post insertion of a Mirena IUD for heavy menses and pelvic pain. Patient with a personal history of endometriosis. Patient states that since insertion of IUD she has had persistent spotting and breakthrough bleeding. Also the pelvic discomfort and painful periods have not improved. Discussed this issue with patient advised patient to have a radiologic pelvic sonogram and then followup consultation to discuss test results and treatment options and possible removal of IUD and consideration of surgical intervention as patient has completed childbearing

## 2021-01-23 NOTE — PHYSICAL EXAM
[Labia Majora] : normal [Labia Minora] : normal [IUD String] : an IUD string was noted [Normal] : normal [Uterine Adnexae] : normal [FreeTextEntry5] : cultures none [FreeTextEntry6] : eft adnexa slightly tender no masses palpable

## 2021-01-24 LAB
C TRACH RRNA SPEC QL NAA+PROBE: NOT DETECTED
N GONORRHOEA RRNA SPEC QL NAA+PROBE: NOT DETECTED
SOURCE AMPLIFICATION: NORMAL

## 2021-01-28 ENCOUNTER — APPOINTMENT (OUTPATIENT)
Dept: ULTRASOUND IMAGING | Facility: CLINIC | Age: 36
End: 2021-01-28
Payer: COMMERCIAL

## 2021-01-28 ENCOUNTER — RESULT REVIEW (OUTPATIENT)
Age: 36
End: 2021-01-28

## 2021-01-28 ENCOUNTER — OUTPATIENT (OUTPATIENT)
Dept: OUTPATIENT SERVICES | Facility: HOSPITAL | Age: 36
LOS: 1 days | End: 2021-01-28
Payer: COMMERCIAL

## 2021-01-28 DIAGNOSIS — Z98.890 OTHER SPECIFIED POSTPROCEDURAL STATES: Chronic | ICD-10-CM

## 2021-01-28 DIAGNOSIS — Z00.8 ENCOUNTER FOR OTHER GENERAL EXAMINATION: ICD-10-CM

## 2021-01-28 DIAGNOSIS — Z87.42 PERSONAL HISTORY OF OTHER DISEASES OF THE FEMALE GENITAL TRACT: ICD-10-CM

## 2021-01-28 PROCEDURE — 76830 TRANSVAGINAL US NON-OB: CPT

## 2021-01-28 PROCEDURE — 76856 US EXAM PELVIC COMPLETE: CPT

## 2021-01-28 PROCEDURE — 76830 TRANSVAGINAL US NON-OB: CPT | Mod: 26

## 2021-01-28 PROCEDURE — 76856 US EXAM PELVIC COMPLETE: CPT | Mod: 26,59

## 2021-01-29 ENCOUNTER — NON-APPOINTMENT (OUTPATIENT)
Age: 36
End: 2021-01-29

## 2021-02-24 ENCOUNTER — APPOINTMENT (OUTPATIENT)
Dept: OBGYN | Facility: CLINIC | Age: 36
End: 2021-02-24
Payer: COMMERCIAL

## 2021-02-24 PROCEDURE — XXXXX: CPT

## 2021-03-18 ENCOUNTER — NON-APPOINTMENT (OUTPATIENT)
Age: 36
End: 2021-03-18

## 2021-03-18 ENCOUNTER — APPOINTMENT (OUTPATIENT)
Dept: ELECTROPHYSIOLOGY | Facility: CLINIC | Age: 36
End: 2021-03-18
Payer: COMMERCIAL

## 2021-03-18 VITALS
WEIGHT: 109 LBS | RESPIRATION RATE: 18 BRPM | OXYGEN SATURATION: 98 % | HEART RATE: 76 BPM | HEIGHT: 62 IN | DIASTOLIC BLOOD PRESSURE: 60 MMHG | BODY MASS INDEX: 20.06 KG/M2 | SYSTOLIC BLOOD PRESSURE: 99 MMHG

## 2021-03-18 PROCEDURE — 93000 ELECTROCARDIOGRAM COMPLETE: CPT

## 2021-03-18 PROCEDURE — 99213 OFFICE O/P EST LOW 20 MIN: CPT

## 2021-03-18 PROCEDURE — 99072 ADDL SUPL MATRL&STAF TM PHE: CPT

## 2021-03-18 RX ORDER — HYDROXYCHLOROQUINE SULFATE 200 MG/1
200 TABLET, FILM COATED ORAL
Qty: 30 | Refills: 0 | Status: DISCONTINUED | COMMUNITY
Start: 2020-09-17 | End: 2021-03-18

## 2021-03-18 RX ORDER — IBUPROFEN 600 MG/1
600 TABLET, FILM COATED ORAL 3 TIMES DAILY
Qty: 30 | Refills: 1 | Status: DISCONTINUED | COMMUNITY
Start: 2021-01-23 | End: 2021-03-18

## 2021-03-18 RX ORDER — PREDNISONE 5 MG/1
5 TABLET ORAL
Qty: 42 | Refills: 0 | Status: DISCONTINUED | COMMUNITY
Start: 2020-08-11 | End: 2021-03-18

## 2021-03-18 RX ORDER — PREDNISONE 5 MG/1
5 TABLET ORAL DAILY
Refills: 0 | Status: ACTIVE | COMMUNITY
Start: 2021-01-05

## 2021-03-18 RX ORDER — NITROFURANTOIN (MONOHYDRATE/MACROCRYSTALS) 25; 75 MG/1; MG/1
100 CAPSULE ORAL
Qty: 14 | Refills: 0 | Status: DISCONTINUED | COMMUNITY
Start: 2020-11-04 | End: 2021-03-18

## 2021-03-18 NOTE — ADDENDUM
[FreeTextEntry1] : I was present for the above evaluation and agree with the history, physical exam, assessment and plan as above.  She has been feeling markedly better since SVT ablation, and is most comfortable continuing low dose beta blockade for now. Will reconsider weaning this medication in the future.

## 2021-03-18 NOTE — REVIEW OF SYSTEMS
[Palpitations] : palpitations [Shortness Of Breath] : no shortness of breath [Dyspnea on exertion] : not dyspnea during exertion [Chest  Pressure] : no chest pressure [Chest Pain] : no chest pain [Lower Ext Edema] : no extremity edema [Dizziness] : no dizziness

## 2021-03-18 NOTE — HISTORY OF PRESENT ILLNESS
[FreeTextEntry1] : Ms. Nevarez is a 35 year old female with history of Lyme disease w/chronic joint pain, Raynaud's\par syndrome, fibromyalgia, suspected SLE and long standing history of palpitations, who presents today for EP follow up s/p successful EPS and RFA of AVNRT on 3/3/20. Her hospital course was complicated by seizure-like events for which she was evaluated by neurology. She has continued neurology follow up and reports she has been free of seizure like activity since discontinuation of LTG. \par \par Since procedure she reports a significant improvement in palpitations. Reports only brief mild palpitations on occasion, not associated with shortness of breath, dizziness, or near syncope as she had felt prior to ablation. Since last follow up she attempted to wean metoprolol to 12.5 mg daily but she did have an increase in palpitations. Event monitoring worn 6/5/20-6/19/20 revealed SR with rare ectopy < 1%. Diagnosis of SLE confirmed, failed hydroxychloroquine, now on methotrexate. \par \par Since last visit she reports she has been feeling well noting mild brief palpitations. Significant symptomatic improvement post ablation. During palpitations she has utilize heart monitoring fabiola which reveals normal HR during symptoms.

## 2021-03-18 NOTE — DISCUSSION/SUMMARY
[FreeTextEntry1] : Ms. Nevarez is a 35 year old female with history of Lyme disease w/chronic joint pain, Raynaud's\par syndrome, fibromyalgia, suspected SLE and long standing history of palpitations, who presents today for EP follow up s/p successful EPS and RFA of AVNRT on 3/3/20. Her hospital course was complicated by seizure-like events for which she was evaluated by neurology. She has continued neurology follow up and reports she has been free of seizure like activity since discontinuation of LTG. \par \par Since procedure she reports a significant improvement in palpitations. Reports only brief mild palpitations on occasion, not associated with shortness of breath, dizziness, or near syncope as she had felt prior to ablation. Since last follow up she attempted to wean metoprolol to 12.5 mg daily but she did have an increase in palpitations. Event monitoring worn 6/5/20-6/19/20 revealed SR with rare ectopy < 1%. Diagnosis of SLE confirmed, failed hydroxychloroquine, now on methotrexate. \par \par Since last visit she reports she has been feeling well noting mild brief palpitations. Significant symptomatic improvement post ablation. During palpitations she has utilize heart monitoring fabiola which reveals normal HR during symptoms. \par \par Recommendation: \par \par -Doing well with significant symptomatic improvement s/p ablation for AVNRT 3/2020. To continue metoprolol 25 mg daily as experienced more frequent palpitations when dosage weaned down correlating with ectopy on event monitoring. Continue to utilize fabiola for symptom/rhythm correlation. \par -EP follow up annually or sooner PRN. \par \par Anitha MADDOX-C

## 2021-07-06 ENCOUNTER — RX RENEWAL (OUTPATIENT)
Age: 36
End: 2021-07-06

## 2021-09-01 ENCOUNTER — APPOINTMENT (OUTPATIENT)
Dept: OBGYN | Facility: CLINIC | Age: 36
End: 2021-09-01
Payer: COMMERCIAL

## 2021-09-01 ENCOUNTER — NON-APPOINTMENT (OUTPATIENT)
Age: 36
End: 2021-09-01

## 2021-09-01 VITALS
BODY MASS INDEX: 20.06 KG/M2 | HEIGHT: 62 IN | RESPIRATION RATE: 16 BRPM | HEART RATE: 70 BPM | OXYGEN SATURATION: 98 % | DIASTOLIC BLOOD PRESSURE: 64 MMHG | SYSTOLIC BLOOD PRESSURE: 100 MMHG | TEMPERATURE: 97.9 F | WEIGHT: 109 LBS

## 2021-09-01 DIAGNOSIS — Z01.419 ENCOUNTER FOR GYNECOLOGICAL EXAMINATION (GENERAL) (ROUTINE) W/OUT ABNORMAL FINDINGS: ICD-10-CM

## 2021-09-01 DIAGNOSIS — T83.84XA PAIN DUE GENIURINARY PROSTHETIC DEVICES, IMPLANTS AND GRAFTS, INITIAL ENCOUNTER: ICD-10-CM

## 2021-09-01 PROCEDURE — 99395 PREV VISIT EST AGE 18-39: CPT

## 2021-09-01 NOTE — HISTORY OF PRESENT ILLNESS
[FreeTextEntry1] : Patient is a 35-year-old female presents for routine annual gynecologic examination. Patient has a Mirena IUD and states she still having discomfort from it would like it removed. Discussed other contraceptive options with patient and literature given patient did decide on possible surgical intervention due to her dysmenorrhea and pelvic discomfort. Patient also with heavy menses. Patient had a mammogram in August of 2020

## 2021-09-01 NOTE — PROCEDURE
[IUD Removal] : intrauterine device (IUD) removal [Time out performed] : Pre-procedure time out performed.  Patient's name, date of birth and procedure confirmed. [Consent Obtained] : Consent obtained [Risks] : risks [Benefits] : benefits [Alternatives] : alternatives [Patient] : patient [Speculum Placed] : speculum placed [IUD Removed - Forceps] : IUD removed - forceps [IUD Discarded] : IUD discarded [Sent to Pathology] : specimen was placed in buffered formalin and sent for pathology [Tolerated Well] : Patient tolerated the procedure well [No Complications] : no complications [Heavy Vaginal Bleeding] : for heavy vaginal bleeding [de-identified] : pain [Pelvic Pain] : for pelvic pain

## 2021-09-01 NOTE — PHYSICAL EXAM
[Appropriately responsive] : appropriately responsive [Alert] : alert [No Acute Distress] : no acute distress [No Lymphadenopathy] : no lymphadenopathy [No Murmurs] : no murmurs [Regular Rate Rhythm] : regular rate rhythm [Soft] : soft [Clear to Auscultation B/L] : clear to auscultation bilaterally [Non-tender] : non-tender [Non-distended] : non-distended [No HSM] : No HSM [No Lesions] : no lesions [No Mass] : no mass [Oriented x3] : oriented x3 [Examination Of The Breasts] : a normal appearance [No Masses] : no breast masses were palpable [Labia Majora] : normal [Labia Minora] : normal [IUD String] : an IUD string was noted [Normal] : normal [Uterine Adnexae] : normal [FreeTextEntry5] : pap done

## 2021-09-03 LAB
C TRACH RRNA SPEC QL NAA+PROBE: NOT DETECTED
HPV HIGH+LOW RISK DNA PNL CVX: NOT DETECTED
N GONORRHOEA RRNA SPEC QL NAA+PROBE: NOT DETECTED
SOURCE TP AMPLIFICATION: NORMAL

## 2021-10-19 ENCOUNTER — APPOINTMENT (OUTPATIENT)
Dept: OBGYN | Facility: CLINIC | Age: 36
End: 2021-10-19
Payer: COMMERCIAL

## 2021-10-19 DIAGNOSIS — N92.0 EXCESSIVE AND FREQUENT MENSTRUATION WITH REGULAR CYCLE: ICD-10-CM

## 2021-10-19 DIAGNOSIS — Z87.42 PERSONAL HISTORY OF OTHER DISEASES OF THE FEMALE GENITAL TRACT: ICD-10-CM

## 2021-10-19 PROCEDURE — 99214 OFFICE O/P EST MOD 30 MIN: CPT

## 2022-01-27 DIAGNOSIS — N92.0 EXCESSIVE AND FREQUENT MENSTRUATION WITH REGULAR CYCLE: ICD-10-CM

## 2022-04-12 ENCOUNTER — NON-APPOINTMENT (OUTPATIENT)
Age: 37
End: 2022-04-12

## 2022-04-12 ENCOUNTER — APPOINTMENT (OUTPATIENT)
Dept: CARDIOLOGY | Facility: CLINIC | Age: 37
End: 2022-04-12
Payer: COMMERCIAL

## 2022-04-12 VITALS
SYSTOLIC BLOOD PRESSURE: 118 MMHG | DIASTOLIC BLOOD PRESSURE: 68 MMHG | HEIGHT: 62 IN | TEMPERATURE: 99.9 F | BODY MASS INDEX: 17.92 KG/M2 | WEIGHT: 97.4 LBS | HEART RATE: 81 BPM | OXYGEN SATURATION: 96 %

## 2022-04-12 DIAGNOSIS — R07.9 CHEST PAIN, UNSPECIFIED: ICD-10-CM

## 2022-04-12 DIAGNOSIS — R06.02 SHORTNESS OF BREATH: ICD-10-CM

## 2022-04-12 PROCEDURE — 93000 ELECTROCARDIOGRAM COMPLETE: CPT | Mod: NC

## 2022-04-12 PROCEDURE — 99204 OFFICE O/P NEW MOD 45 MIN: CPT

## 2022-04-12 RX ORDER — METHOTREXATE 15 MG/1
15 TABLET, FILM COATED ORAL
Refills: 0 | Status: DISCONTINUED | COMMUNITY
Start: 2021-03-18 | End: 2022-04-12

## 2022-04-12 RX ORDER — ONDANSETRON 4 MG/1
4 TABLET, ORALLY DISINTEGRATING ORAL
Qty: 12 | Refills: 0 | Status: DISCONTINUED | COMMUNITY
Start: 2020-03-11 | End: 2022-04-12

## 2022-04-12 RX ORDER — HYDROXYCHLOROQUINE SULFATE 200 MG/1
200 TABLET, FILM COATED ORAL
Refills: 0 | Status: ACTIVE | COMMUNITY

## 2022-04-12 RX ORDER — GABAPENTIN 400 MG/1
400 CAPSULE ORAL
Refills: 0 | Status: DISCONTINUED | COMMUNITY
Start: 2021-01-04 | End: 2022-04-12

## 2022-04-12 RX ORDER — MISOPROSTOL 200 UG/1
200 TABLET ORAL
Qty: 2 | Refills: 0 | Status: DISCONTINUED | COMMUNITY
Start: 2021-10-19 | End: 2022-04-12

## 2022-04-12 RX ORDER — EPINEPHRINE 0.3 MG/.3ML
0.3 INJECTION INTRAMUSCULAR
Refills: 0 | Status: ACTIVE | COMMUNITY
Start: 2020-05-05

## 2022-04-15 ENCOUNTER — APPOINTMENT (OUTPATIENT)
Dept: CARDIOLOGY | Facility: CLINIC | Age: 37
End: 2022-04-15
Payer: COMMERCIAL

## 2022-04-15 PROCEDURE — 93306 TTE W/DOPPLER COMPLETE: CPT

## 2022-04-21 ENCOUNTER — APPOINTMENT (OUTPATIENT)
Dept: CARDIOLOGY | Facility: CLINIC | Age: 37
End: 2022-04-21
Payer: COMMERCIAL

## 2022-04-21 PROCEDURE — 93015 CV STRESS TEST SUPVJ I&R: CPT

## 2022-04-22 ENCOUNTER — TRANSCRIPTION ENCOUNTER (OUTPATIENT)
Age: 37
End: 2022-04-22

## 2022-05-02 ENCOUNTER — OUTPATIENT (OUTPATIENT)
Dept: OUTPATIENT SERVICES | Facility: HOSPITAL | Age: 37
LOS: 1 days | End: 2022-05-02
Payer: COMMERCIAL

## 2022-05-02 VITALS
SYSTOLIC BLOOD PRESSURE: 109 MMHG | HEIGHT: 62 IN | WEIGHT: 97 LBS | OXYGEN SATURATION: 100 % | HEART RATE: 86 BPM | TEMPERATURE: 98 F | RESPIRATION RATE: 18 BRPM | DIASTOLIC BLOOD PRESSURE: 78 MMHG

## 2022-05-02 DIAGNOSIS — Z98.890 OTHER SPECIFIED POSTPROCEDURAL STATES: Chronic | ICD-10-CM

## 2022-05-02 DIAGNOSIS — C43.9 MALIGNANT MELANOMA OF SKIN, UNSPECIFIED: Chronic | ICD-10-CM

## 2022-05-02 DIAGNOSIS — Z01.818 ENCOUNTER FOR OTHER PREPROCEDURAL EXAMINATION: ICD-10-CM

## 2022-05-02 DIAGNOSIS — N92.0 EXCESSIVE AND FREQUENT MENSTRUATION WITH REGULAR CYCLE: ICD-10-CM

## 2022-05-02 LAB
A1C WITH ESTIMATED AVERAGE GLUCOSE RESULT: 5.5 % — SIGNIFICANT CHANGE UP (ref 4–5.6)
ANION GAP SERPL CALC-SCNC: 1 MMOL/L — LOW (ref 5–17)
APPEARANCE UR: CLEAR — SIGNIFICANT CHANGE UP
APTT BLD: 31.3 SEC — SIGNIFICANT CHANGE UP (ref 27.5–35.5)
BASOPHILS # BLD AUTO: 0.06 K/UL — SIGNIFICANT CHANGE UP (ref 0–0.2)
BASOPHILS NFR BLD AUTO: 0.6 % — SIGNIFICANT CHANGE UP (ref 0–2)
BILIRUB UR-MCNC: NEGATIVE — SIGNIFICANT CHANGE UP
BUN SERPL-MCNC: 12 MG/DL — SIGNIFICANT CHANGE UP (ref 7–23)
CALCIUM SERPL-MCNC: 9.3 MG/DL — SIGNIFICANT CHANGE UP (ref 8.5–10.1)
CHLORIDE SERPL-SCNC: 106 MMOL/L — SIGNIFICANT CHANGE UP (ref 96–108)
CO2 SERPL-SCNC: 29 MMOL/L — SIGNIFICANT CHANGE UP (ref 22–31)
COLOR SPEC: YELLOW — SIGNIFICANT CHANGE UP
CREAT SERPL-MCNC: 0.98 MG/DL — SIGNIFICANT CHANGE UP (ref 0.5–1.3)
DIFF PNL FLD: ABNORMAL
EGFR: 77 ML/MIN/1.73M2 — SIGNIFICANT CHANGE UP
EOSINOPHIL # BLD AUTO: 0.06 K/UL — SIGNIFICANT CHANGE UP (ref 0–0.5)
EOSINOPHIL NFR BLD AUTO: 0.6 % — SIGNIFICANT CHANGE UP (ref 0–6)
ESTIMATED AVERAGE GLUCOSE: 111 MG/DL — SIGNIFICANT CHANGE UP (ref 68–114)
GLUCOSE SERPL-MCNC: 137 MG/DL — HIGH (ref 70–99)
GLUCOSE UR QL: NEGATIVE — SIGNIFICANT CHANGE UP
HCT VFR BLD CALC: 41.4 % — SIGNIFICANT CHANGE UP (ref 34.5–45)
HGB BLD-MCNC: 13.7 G/DL — SIGNIFICANT CHANGE UP (ref 11.5–15.5)
IMM GRANULOCYTES NFR BLD AUTO: 0.3 % — SIGNIFICANT CHANGE UP (ref 0–1.5)
INR BLD: 0.99 RATIO — SIGNIFICANT CHANGE UP (ref 0.88–1.16)
KETONES UR-MCNC: NEGATIVE — SIGNIFICANT CHANGE UP
LEUKOCYTE ESTERASE UR-ACNC: ABNORMAL
LYMPHOCYTES # BLD AUTO: 3.04 K/UL — SIGNIFICANT CHANGE UP (ref 1–3.3)
LYMPHOCYTES # BLD AUTO: 31.1 % — SIGNIFICANT CHANGE UP (ref 13–44)
MCHC RBC-ENTMCNC: 32.7 PG — SIGNIFICANT CHANGE UP (ref 27–34)
MCHC RBC-ENTMCNC: 33.1 GM/DL — SIGNIFICANT CHANGE UP (ref 32–36)
MCV RBC AUTO: 98.8 FL — SIGNIFICANT CHANGE UP (ref 80–100)
MONOCYTES # BLD AUTO: 0.5 K/UL — SIGNIFICANT CHANGE UP (ref 0–0.9)
MONOCYTES NFR BLD AUTO: 5.1 % — SIGNIFICANT CHANGE UP (ref 2–14)
NEUTROPHILS # BLD AUTO: 6.07 K/UL — SIGNIFICANT CHANGE UP (ref 1.8–7.4)
NEUTROPHILS NFR BLD AUTO: 62.3 % — SIGNIFICANT CHANGE UP (ref 43–77)
NITRITE UR-MCNC: NEGATIVE — SIGNIFICANT CHANGE UP
PH UR: 7 — SIGNIFICANT CHANGE UP (ref 5–8)
PLATELET # BLD AUTO: 329 K/UL — SIGNIFICANT CHANGE UP (ref 150–400)
POTASSIUM SERPL-MCNC: 4.4 MMOL/L — SIGNIFICANT CHANGE UP (ref 3.5–5.3)
POTASSIUM SERPL-SCNC: 4.4 MMOL/L — SIGNIFICANT CHANGE UP (ref 3.5–5.3)
PROT UR-MCNC: NEGATIVE — SIGNIFICANT CHANGE UP
PROTHROM AB SERPL-ACNC: 11.5 SEC — SIGNIFICANT CHANGE UP (ref 10.5–13.4)
RBC # BLD: 4.19 M/UL — SIGNIFICANT CHANGE UP (ref 3.8–5.2)
RBC # FLD: 12.7 % — SIGNIFICANT CHANGE UP (ref 10.3–14.5)
SODIUM SERPL-SCNC: 136 MMOL/L — SIGNIFICANT CHANGE UP (ref 135–145)
SP GR SPEC: 1 — LOW (ref 1.01–1.02)
UROBILINOGEN FLD QL: NEGATIVE — SIGNIFICANT CHANGE UP
WBC # BLD: 9.76 K/UL — SIGNIFICANT CHANGE UP (ref 3.8–10.5)
WBC # FLD AUTO: 9.76 K/UL — SIGNIFICANT CHANGE UP (ref 3.8–10.5)

## 2022-05-02 PROCEDURE — 93010 ELECTROCARDIOGRAM REPORT: CPT

## 2022-05-02 PROCEDURE — 80048 BASIC METABOLIC PNL TOTAL CA: CPT

## 2022-05-02 PROCEDURE — 86900 BLOOD TYPING SEROLOGIC ABO: CPT

## 2022-05-02 PROCEDURE — 83036 HEMOGLOBIN GLYCOSYLATED A1C: CPT

## 2022-05-02 PROCEDURE — 86901 BLOOD TYPING SEROLOGIC RH(D): CPT

## 2022-05-02 PROCEDURE — 85610 PROTHROMBIN TIME: CPT

## 2022-05-02 PROCEDURE — 36415 COLL VENOUS BLD VENIPUNCTURE: CPT

## 2022-05-02 PROCEDURE — 86850 RBC ANTIBODY SCREEN: CPT

## 2022-05-02 PROCEDURE — G0463: CPT | Mod: 25

## 2022-05-02 PROCEDURE — 93005 ELECTROCARDIOGRAM TRACING: CPT

## 2022-05-02 PROCEDURE — 85730 THROMBOPLASTIN TIME PARTIAL: CPT

## 2022-05-02 PROCEDURE — 85025 COMPLETE CBC W/AUTO DIFF WBC: CPT

## 2022-05-02 PROCEDURE — 81001 URINALYSIS AUTO W/SCOPE: CPT

## 2022-05-02 RX ORDER — LAMOTRIGINE 25 MG/1
0 TABLET, ORALLY DISINTEGRATING ORAL
Qty: 0 | Refills: 0 | DISCHARGE

## 2022-05-02 NOTE — H&P PST ADULT - NSICDXPASTMEDICALHX_GEN_ALL_CORE_FT
PAST MEDICAL HISTORY:  Anxiety     Arthritis     Dysmenorrhea     Endometriosis     Fibromyalgia     H/O alopecia     H/O sinus tachycardia     Insomnia     Lupus     Lyme arthritis     Menorrhagia     Raynauds syndrome     SVT (supraventricular tachycardia) S/P ablation 2020     PAST MEDICAL HISTORY:  Anxiety     Arthritis     Dysmenorrhea     Endometriosis     Fibromyalgia     H/O alopecia     H/O sinus tachycardia     History of seizure after cardiac abltion in 2020 at [facility]    Insomnia     Lupus     Lyme arthritis     Menorrhagia     Raynauds syndrome     SVT (supraventricular tachycardia) S/P ablation 2020

## 2022-05-02 NOTE — H&P PST ADULT - ANESTHESIA, PREVIOUS REACTION, PROFILE
"seizure after cardiac ablation" in 2020 at [facility] pt reports history of "seizure after cardiac ablation" in 2020 at [facility]/not sure

## 2022-05-02 NOTE — H&P PST ADULT - ASSESSMENT
37 y/o female with personal history of endometriosis, menorrhagia, dysmenorrhea. fibromyalgia. Pt complain of pelvic pain, excessive menstrual bleeding with cramps. She is scheduled for Robotic supra cervical hysterectomy, B/L salpingectomy, resection of endometriosis, possible laparotomy.   Plan  1. Stop all NSAIDS, herbal supplements and vitamins for 7 days.  2. NPO as per ASU instructions.  3. Take the following medications ( Gabapentin ) with small sips of water on the morning of your procedure/surgery.  4. Use EZ sponges as directed  5. Labs, EKG as per surgeon. COVID test on 05/08/2022, pt instructed.  6. PMD visit for optimization prior to surgery as per surgeon  7. STAT urine pregnancy test on admission.            35 y/o female with personal history of endometriosis, menorrhagia, dysmenorrhea. fibromyalgia. Pt complain of pelvic pain, excessive menstrual bleeding with cramps. She is scheduled for Robotic supra cervical hysterectomy, B/L salpingectomy, resection of endometriosis, possible laparotomy.   Plan  1. Stop all NSAIDS, herbal supplements and vitamins for 7 days.  2. NPO as per ASU instructions.  3. Take the following medications ( Gabapentin ) with small sips of water on the morning of your procedure/surgery.  4. Use EZ sponges as directed  5. Labs, EKG as per surgeon. COVID test on 05/08/2022, pt instructed.  6. Cardiologist visit for optimization prior to surgery as per surgeon  7. STAT urine pregnancy test on admission.

## 2022-05-02 NOTE — H&P PST ADULT - HISTORY OF PRESENT ILLNESS
35 y/o female with personal history of endometriosis, menorrhagia, dysmenorrhea. fibromyalgia. Pt complain of pelvic pain, excessive menstrual bleeding with cramps. She is here for PST for planned  37 y/o female with personal history of endometriosis, menorrhagia, dysmenorrhea. fibromyalgia. Pt complain of pelvic pain, excessive menstrual bleeding with cramps. She is here for PST for planned Robotic supra cervical hysterectomy, B/L salpingectomy, resection of endometriosis, possible laparotomy.

## 2022-05-02 NOTE — H&P PST ADULT - NSICDXPASTSURGICALHX_GEN_ALL_CORE_FT
PAST SURGICAL HISTORY:  H/O laparoscopy for endometriosis 2x    H/O oral surgery     History of removal of cyst scalp X 2    S/P ablation operation for arrhythmia

## 2022-05-02 NOTE — H&P PST ADULT - NSICDXFAMILYHX_GEN_ALL_CORE_FT
FAMILY HISTORY:  Father  Still living? Unknown  Family history of diabetes mellitus (DM), Age at diagnosis: Age Unknown  Family history of heart disease, Age at diagnosis: Age Unknown    Grandparent  Still living? Unknown  Family history of diabetes mellitus (DM), Age at diagnosis: Age Unknown    Aunt  Still living? Unknown  FH: breast cancer, Age at diagnosis: Age Unknown

## 2022-05-03 DIAGNOSIS — N92.0 EXCESSIVE AND FREQUENT MENSTRUATION WITH REGULAR CYCLE: ICD-10-CM

## 2022-05-03 DIAGNOSIS — Z01.818 ENCOUNTER FOR OTHER PREPROCEDURAL EXAMINATION: ICD-10-CM

## 2022-05-04 ENCOUNTER — APPOINTMENT (OUTPATIENT)
Dept: OBGYN | Facility: CLINIC | Age: 37
End: 2022-05-04
Payer: COMMERCIAL

## 2022-05-04 VITALS — BODY MASS INDEX: 17.74 KG/M2 | WEIGHT: 97 LBS | DIASTOLIC BLOOD PRESSURE: 64 MMHG | SYSTOLIC BLOOD PRESSURE: 96 MMHG

## 2022-05-04 DIAGNOSIS — R10.2 PELVIC AND PERINEAL PAIN: ICD-10-CM

## 2022-05-04 DIAGNOSIS — Z87.42 PERSONAL HISTORY OF OTHER DISEASES OF THE FEMALE GENITAL TRACT: ICD-10-CM

## 2022-05-04 DIAGNOSIS — N94.6 DYSMENORRHEA, UNSPECIFIED: ICD-10-CM

## 2022-05-04 PROCEDURE — 99214 OFFICE O/P EST MOD 30 MIN: CPT

## 2022-05-04 NOTE — HISTORY OF PRESENT ILLNESS
[FreeTextEntry1] : .Patient is a 36-year-old female presents for her  for a preop consultation.  Patient with a history of significant dysmenorrhea pelvic pain and heavy menses.  Patient has had multiple treatments in the past all unsuccessful including IUD.  Patient with a history of endometriosis diagnosed at 2 laparoscopies years ago.  Patient desired definitive surgical intervention and is scheduled for a robotically assisted laparoscopic supracervical hysterectomy, bilateral salpingectomy, cystoscopy and ICG ureteral catheterization possible resection of endometriosis and possible laparotomy.  All material risk benefits and alternatives have been reviewed with the patient in detail and questions answered.  Preop instructions reviewed patient is to have medical clearance.  Questions answered.  Instructions and precautions discussed.\par \par 30 minutes of face time

## 2022-05-10 RX ORDER — ONDANSETRON 8 MG/1
4 TABLET, FILM COATED ORAL ONCE
Refills: 0 | Status: DISCONTINUED | OUTPATIENT
Start: 2022-05-11 | End: 2022-05-11

## 2022-05-10 RX ORDER — SODIUM CHLORIDE 9 MG/ML
1000 INJECTION, SOLUTION INTRAVENOUS
Refills: 0 | Status: DISCONTINUED | OUTPATIENT
Start: 2022-05-11 | End: 2022-05-11

## 2022-05-10 RX ORDER — SODIUM CHLORIDE 9 MG/ML
1000 INJECTION INTRAMUSCULAR; INTRAVENOUS; SUBCUTANEOUS
Refills: 0 | Status: DISCONTINUED | OUTPATIENT
Start: 2022-05-11 | End: 2022-05-12

## 2022-05-10 RX ORDER — FENTANYL CITRATE 50 UG/ML
50 INJECTION INTRAVENOUS
Refills: 0 | Status: DISCONTINUED | OUTPATIENT
Start: 2022-05-11 | End: 2022-05-11

## 2022-05-10 RX ORDER — MEPERIDINE HYDROCHLORIDE 50 MG/ML
12.5 INJECTION INTRAMUSCULAR; INTRAVENOUS; SUBCUTANEOUS
Refills: 0 | Status: DISCONTINUED | OUTPATIENT
Start: 2022-05-11 | End: 2022-05-11

## 2022-05-11 ENCOUNTER — APPOINTMENT (OUTPATIENT)
Dept: OBGYN | Facility: HOSPITAL | Age: 37
End: 2022-05-11

## 2022-05-11 ENCOUNTER — RESULT REVIEW (OUTPATIENT)
Age: 37
End: 2022-05-11

## 2022-05-11 ENCOUNTER — OUTPATIENT (OUTPATIENT)
Dept: INPATIENT UNIT | Facility: HOSPITAL | Age: 37
LOS: 1 days | Discharge: HOME | End: 2022-05-11
Payer: COMMERCIAL

## 2022-05-11 VITALS
HEART RATE: 81 BPM | OXYGEN SATURATION: 98 % | HEIGHT: 62 IN | WEIGHT: 97 LBS | TEMPERATURE: 98 F | SYSTOLIC BLOOD PRESSURE: 86 MMHG | RESPIRATION RATE: 15 BRPM | DIASTOLIC BLOOD PRESSURE: 52 MMHG

## 2022-05-11 DIAGNOSIS — Z79.52 LONG TERM (CURRENT) USE OF SYSTEMIC STEROIDS: ICD-10-CM

## 2022-05-11 DIAGNOSIS — Z86.16 PERSONAL HISTORY OF COVID-19: ICD-10-CM

## 2022-05-11 DIAGNOSIS — Z98.890 OTHER SPECIFIED POSTPROCEDURAL STATES: Chronic | ICD-10-CM

## 2022-05-11 DIAGNOSIS — Z79.891 LONG TERM (CURRENT) USE OF OPIATE ANALGESIC: ICD-10-CM

## 2022-05-11 DIAGNOSIS — M79.7 FIBROMYALGIA: ICD-10-CM

## 2022-05-11 DIAGNOSIS — N80.0 ENDOMETRIOSIS OF UTERUS: ICD-10-CM

## 2022-05-11 DIAGNOSIS — Z87.42 PERSONAL HISTORY OF OTHER DISEASES OF THE FEMALE GENITAL TRACT: ICD-10-CM

## 2022-05-11 DIAGNOSIS — N92.0 EXCESSIVE AND FREQUENT MENSTRUATION WITH REGULAR CYCLE: ICD-10-CM

## 2022-05-11 DIAGNOSIS — Z79.899 OTHER LONG TERM (CURRENT) DRUG THERAPY: ICD-10-CM

## 2022-05-11 DIAGNOSIS — M32.9 SYSTEMIC LUPUS ERYTHEMATOSUS, UNSPECIFIED: ICD-10-CM

## 2022-05-11 DIAGNOSIS — F17.210 NICOTINE DEPENDENCE, CIGARETTES, UNCOMPLICATED: ICD-10-CM

## 2022-05-11 DIAGNOSIS — N94.6 DYSMENORRHEA, UNSPECIFIED: ICD-10-CM

## 2022-05-11 LAB
ANION GAP SERPL CALC-SCNC: 4 MMOL/L — LOW (ref 5–17)
BASOPHILS # BLD AUTO: 0.03 K/UL — SIGNIFICANT CHANGE UP (ref 0–0.2)
BASOPHILS NFR BLD AUTO: 0.2 % — SIGNIFICANT CHANGE UP (ref 0–2)
BUN SERPL-MCNC: 11 MG/DL — SIGNIFICANT CHANGE UP (ref 7–23)
CALCIUM SERPL-MCNC: 8.7 MG/DL — SIGNIFICANT CHANGE UP (ref 8.5–10.1)
CHLORIDE SERPL-SCNC: 108 MMOL/L — SIGNIFICANT CHANGE UP (ref 96–108)
CO2 SERPL-SCNC: 26 MMOL/L — SIGNIFICANT CHANGE UP (ref 22–31)
CREAT SERPL-MCNC: 0.85 MG/DL — SIGNIFICANT CHANGE UP (ref 0.5–1.3)
EGFR: 91 ML/MIN/1.73M2 — SIGNIFICANT CHANGE UP
EOSINOPHIL # BLD AUTO: 0.01 K/UL — SIGNIFICANT CHANGE UP (ref 0–0.5)
EOSINOPHIL NFR BLD AUTO: 0.1 % — SIGNIFICANT CHANGE UP (ref 0–6)
GLUCOSE SERPL-MCNC: 121 MG/DL — HIGH (ref 70–99)
HCG UR QL: NEGATIVE — SIGNIFICANT CHANGE UP
HCT VFR BLD CALC: 36.1 % — SIGNIFICANT CHANGE UP (ref 34.5–45)
HGB BLD-MCNC: 12.2 G/DL — SIGNIFICANT CHANGE UP (ref 11.5–15.5)
IMM GRANULOCYTES NFR BLD AUTO: 0.5 % — SIGNIFICANT CHANGE UP (ref 0–1.5)
LYMPHOCYTES # BLD AUTO: 1.21 K/UL — SIGNIFICANT CHANGE UP (ref 1–3.3)
LYMPHOCYTES # BLD AUTO: 7.1 % — LOW (ref 13–44)
MCHC RBC-ENTMCNC: 33.1 PG — SIGNIFICANT CHANGE UP (ref 27–34)
MCHC RBC-ENTMCNC: 33.8 GM/DL — SIGNIFICANT CHANGE UP (ref 32–36)
MCV RBC AUTO: 97.8 FL — SIGNIFICANT CHANGE UP (ref 80–100)
MONOCYTES # BLD AUTO: 0.34 K/UL — SIGNIFICANT CHANGE UP (ref 0–0.9)
MONOCYTES NFR BLD AUTO: 2 % — SIGNIFICANT CHANGE UP (ref 2–14)
NEUTROPHILS # BLD AUTO: 15.36 K/UL — HIGH (ref 1.8–7.4)
NEUTROPHILS NFR BLD AUTO: 90.1 % — HIGH (ref 43–77)
PLATELET # BLD AUTO: 269 K/UL — SIGNIFICANT CHANGE UP (ref 150–400)
POTASSIUM SERPL-MCNC: 4.1 MMOL/L — SIGNIFICANT CHANGE UP (ref 3.5–5.3)
POTASSIUM SERPL-SCNC: 4.1 MMOL/L — SIGNIFICANT CHANGE UP (ref 3.5–5.3)
RBC # BLD: 3.69 M/UL — LOW (ref 3.8–5.2)
RBC # FLD: 12.3 % — SIGNIFICANT CHANGE UP (ref 10.3–14.5)
SODIUM SERPL-SCNC: 138 MMOL/L — SIGNIFICANT CHANGE UP (ref 135–145)
WBC # BLD: 17.03 K/UL — HIGH (ref 3.8–10.5)
WBC # FLD AUTO: 17.03 K/UL — HIGH (ref 3.8–10.5)

## 2022-05-11 PROCEDURE — S2900 ROBOTIC SURGICAL SYSTEM: CPT | Mod: NC

## 2022-05-11 PROCEDURE — 58542 LSH W/T/O UT 250 G OR LESS: CPT | Mod: AS

## 2022-05-11 PROCEDURE — S2900: CPT

## 2022-05-11 PROCEDURE — 88307 TISSUE EXAM BY PATHOLOGIST: CPT | Mod: 26

## 2022-05-11 PROCEDURE — 58542 LSH W/T/O UT 250 G OR LESS: CPT

## 2022-05-11 PROCEDURE — 36415 COLL VENOUS BLD VENIPUNCTURE: CPT

## 2022-05-11 PROCEDURE — 58662 LAPAROSCOPY EXCISE LESIONS: CPT | Mod: AS

## 2022-05-11 PROCEDURE — C1889: CPT

## 2022-05-11 PROCEDURE — 52005 CYSTO W/URTRL CATHJ: CPT

## 2022-05-11 PROCEDURE — 58542 LSH W/T/O UT 250 G OR LESS: CPT | Mod: 80

## 2022-05-11 PROCEDURE — 88307 TISSUE EXAM BY PATHOLOGIST: CPT

## 2022-05-11 PROCEDURE — 82962 GLUCOSE BLOOD TEST: CPT

## 2022-05-11 PROCEDURE — 81025 URINE PREGNANCY TEST: CPT

## 2022-05-11 PROCEDURE — C9399: CPT

## 2022-05-11 PROCEDURE — 58662 LAPAROSCOPY EXCISE LESIONS: CPT

## 2022-05-11 PROCEDURE — 85025 COMPLETE CBC W/AUTO DIFF WBC: CPT

## 2022-05-11 PROCEDURE — 58662 LAPAROSCOPY EXCISE LESIONS: CPT | Mod: 62

## 2022-05-11 PROCEDURE — 80048 BASIC METABOLIC PNL TOTAL CA: CPT

## 2022-05-11 RX ORDER — METOPROLOL TARTRATE 50 MG
1 TABLET ORAL
Qty: 0 | Refills: 0 | DISCHARGE

## 2022-05-11 RX ORDER — GABAPENTIN 400 MG/1
1 CAPSULE ORAL
Qty: 0 | Refills: 0 | DISCHARGE

## 2022-05-11 RX ORDER — HYDROMORPHONE HYDROCHLORIDE 2 MG/ML
0.5 INJECTION INTRAMUSCULAR; INTRAVENOUS; SUBCUTANEOUS
Refills: 0 | Status: DISCONTINUED | OUTPATIENT
Start: 2022-05-11 | End: 2022-05-12

## 2022-05-11 RX ORDER — GABAPENTIN 400 MG/1
300 CAPSULE ORAL
Refills: 0 | Status: DISCONTINUED | OUTPATIENT
Start: 2022-05-11 | End: 2022-05-11

## 2022-05-11 RX ORDER — OXYCODONE HYDROCHLORIDE 5 MG/1
5 TABLET ORAL ONCE
Refills: 0 | Status: DISCONTINUED | OUTPATIENT
Start: 2022-05-11 | End: 2022-05-11

## 2022-05-11 RX ORDER — CEFAZOLIN SODIUM 1 G
1000 VIAL (EA) INJECTION EVERY 8 HOURS
Refills: 0 | Status: DISCONTINUED | OUTPATIENT
Start: 2022-05-11 | End: 2022-05-11

## 2022-05-11 RX ORDER — ENOXAPARIN SODIUM 100 MG/ML
40 INJECTION SUBCUTANEOUS ONCE
Refills: 0 | Status: COMPLETED | OUTPATIENT
Start: 2022-05-11 | End: 2022-05-11

## 2022-05-11 RX ORDER — CHOLECALCIFEROL (VITAMIN D3) 125 MCG
1 CAPSULE ORAL
Qty: 0 | Refills: 0 | DISCHARGE

## 2022-05-11 RX ORDER — ACETAMINOPHEN 500 MG
1000 TABLET ORAL ONCE
Refills: 0 | Status: COMPLETED | OUTPATIENT
Start: 2022-05-11 | End: 2022-05-11

## 2022-05-11 RX ORDER — DEXTROSE MONOHYDRATE, SODIUM CHLORIDE, AND POTASSIUM CHLORIDE 50; .745; 4.5 G/1000ML; G/1000ML; G/1000ML
1000 INJECTION, SOLUTION INTRAVENOUS
Refills: 0 | Status: DISCONTINUED | OUTPATIENT
Start: 2022-05-11 | End: 2022-05-12

## 2022-05-11 RX ORDER — KETOROLAC TROMETHAMINE 30 MG/ML
30 SYRINGE (ML) INJECTION EVERY 6 HOURS
Refills: 0 | Status: COMPLETED | OUTPATIENT
Start: 2022-05-11 | End: 2022-05-12

## 2022-05-11 RX ORDER — ACETAMINOPHEN 500 MG
975 TABLET ORAL
Refills: 0 | Status: DISCONTINUED | OUTPATIENT
Start: 2022-05-11 | End: 2022-05-12

## 2022-05-11 RX ORDER — CEFAZOLIN SODIUM 1 G
2000 VIAL (EA) INJECTION ONCE
Refills: 0 | Status: COMPLETED | OUTPATIENT
Start: 2022-05-11 | End: 2022-05-11

## 2022-05-11 RX ORDER — HYDROMORPHONE HYDROCHLORIDE 2 MG/ML
0.5 INJECTION INTRAMUSCULAR; INTRAVENOUS; SUBCUTANEOUS
Refills: 0 | Status: DISCONTINUED | OUTPATIENT
Start: 2022-05-11 | End: 2022-05-11

## 2022-05-11 RX ORDER — CELECOXIB 200 MG/1
400 CAPSULE ORAL ONCE
Refills: 0 | Status: COMPLETED | OUTPATIENT
Start: 2022-05-11 | End: 2022-05-11

## 2022-05-11 RX ORDER — GABAPENTIN 400 MG/1
600 CAPSULE ORAL ONCE
Refills: 0 | Status: COMPLETED | OUTPATIENT
Start: 2022-05-11 | End: 2022-05-11

## 2022-05-11 RX ORDER — OXYCODONE HYDROCHLORIDE 5 MG/1
5 TABLET ORAL ONCE
Refills: 0 | Status: DISCONTINUED | OUTPATIENT
Start: 2022-05-11 | End: 2022-05-12

## 2022-05-11 RX ORDER — METOPROLOL TARTRATE 50 MG
25 TABLET ORAL DAILY
Refills: 0 | Status: DISCONTINUED | OUTPATIENT
Start: 2022-05-11 | End: 2022-05-12

## 2022-05-11 RX ORDER — HYDROXYCHLOROQUINE SULFATE 200 MG
1 TABLET ORAL
Qty: 0 | Refills: 0 | DISCHARGE

## 2022-05-11 RX ORDER — OXYCODONE HYDROCHLORIDE 5 MG/1
5 TABLET ORAL
Refills: 0 | Status: DISCONTINUED | OUTPATIENT
Start: 2022-05-11 | End: 2022-05-12

## 2022-05-11 RX ORDER — FLUTICASONE PROPIONATE 50 MCG
1 SPRAY, SUSPENSION NASAL
Qty: 0 | Refills: 0 | DISCHARGE

## 2022-05-11 RX ORDER — IBUPROFEN 200 MG
600 TABLET ORAL EVERY 6 HOURS
Refills: 0 | Status: DISCONTINUED | OUTPATIENT
Start: 2022-05-11 | End: 2022-05-12

## 2022-05-11 RX ORDER — METRONIDAZOLE 500 MG
500 TABLET ORAL EVERY 8 HOURS
Refills: 0 | Status: COMPLETED | OUTPATIENT
Start: 2022-05-11 | End: 2022-05-12

## 2022-05-11 RX ORDER — GABAPENTIN 400 MG/1
600 CAPSULE ORAL EVERY 12 HOURS
Refills: 0 | Status: DISCONTINUED | OUTPATIENT
Start: 2022-05-11 | End: 2022-05-12

## 2022-05-11 RX ORDER — CEFAZOLIN SODIUM 1 G
1000 VIAL (EA) INJECTION EVERY 8 HOURS
Refills: 0 | Status: COMPLETED | OUTPATIENT
Start: 2022-05-11 | End: 2022-05-12

## 2022-05-11 RX ADMIN — HYDROMORPHONE HYDROCHLORIDE 0.5 MILLIGRAM(S): 2 INJECTION INTRAMUSCULAR; INTRAVENOUS; SUBCUTANEOUS at 15:30

## 2022-05-11 RX ADMIN — OXYCODONE HYDROCHLORIDE 5 MILLIGRAM(S): 5 TABLET ORAL at 10:45

## 2022-05-11 RX ADMIN — FENTANYL CITRATE 50 MICROGRAM(S): 50 INJECTION INTRAVENOUS at 09:31

## 2022-05-11 RX ADMIN — Medication 100 MILLIGRAM(S): at 17:15

## 2022-05-11 RX ADMIN — Medication 25 MILLIGRAM(S): at 21:24

## 2022-05-11 RX ADMIN — CELECOXIB 400 MILLIGRAM(S): 200 CAPSULE ORAL at 07:07

## 2022-05-11 RX ADMIN — Medication 1000 MILLIGRAM(S): at 06:58

## 2022-05-11 RX ADMIN — Medication 1000 MILLIGRAM(S): at 17:15

## 2022-05-11 RX ADMIN — HYDROMORPHONE HYDROCHLORIDE 0.5 MILLIGRAM(S): 2 INJECTION INTRAMUSCULAR; INTRAVENOUS; SUBCUTANEOUS at 10:45

## 2022-05-11 RX ADMIN — FENTANYL CITRATE 50 MICROGRAM(S): 50 INJECTION INTRAVENOUS at 10:00

## 2022-05-11 RX ADMIN — FENTANYL CITRATE 50 MICROGRAM(S): 50 INJECTION INTRAVENOUS at 09:45

## 2022-05-11 RX ADMIN — HYDROMORPHONE HYDROCHLORIDE 0.5 MILLIGRAM(S): 2 INJECTION INTRAMUSCULAR; INTRAVENOUS; SUBCUTANEOUS at 13:00

## 2022-05-11 RX ADMIN — HYDROMORPHONE HYDROCHLORIDE 0.5 MILLIGRAM(S): 2 INJECTION INTRAMUSCULAR; INTRAVENOUS; SUBCUTANEOUS at 10:30

## 2022-05-11 RX ADMIN — ENOXAPARIN SODIUM 40 MILLIGRAM(S): 100 INJECTION SUBCUTANEOUS at 21:24

## 2022-05-11 RX ADMIN — Medication 1000 MILLIGRAM(S): at 07:07

## 2022-05-11 RX ADMIN — HYDROMORPHONE HYDROCHLORIDE 0.5 MILLIGRAM(S): 2 INJECTION INTRAMUSCULAR; INTRAVENOUS; SUBCUTANEOUS at 15:10

## 2022-05-11 RX ADMIN — HYDROMORPHONE HYDROCHLORIDE 0.5 MILLIGRAM(S): 2 INJECTION INTRAMUSCULAR; INTRAVENOUS; SUBCUTANEOUS at 12:00

## 2022-05-11 RX ADMIN — SODIUM CHLORIDE 75 MILLILITER(S): 9 INJECTION, SOLUTION INTRAVENOUS at 12:00

## 2022-05-11 RX ADMIN — GABAPENTIN 300 MILLIGRAM(S): 400 CAPSULE ORAL at 12:04

## 2022-05-11 RX ADMIN — FENTANYL CITRATE 50 MICROGRAM(S): 50 INJECTION INTRAVENOUS at 09:46

## 2022-05-11 RX ADMIN — GABAPENTIN 600 MILLIGRAM(S): 400 CAPSULE ORAL at 21:24

## 2022-05-11 RX ADMIN — CELECOXIB 400 MILLIGRAM(S): 200 CAPSULE ORAL at 07:06

## 2022-05-11 RX ADMIN — Medication 30 MILLIGRAM(S): at 23:22

## 2022-05-11 RX ADMIN — OXYCODONE HYDROCHLORIDE 5 MILLIGRAM(S): 5 TABLET ORAL at 09:31

## 2022-05-11 NOTE — ASU PATIENT PROFILE, ADULT - ANESTHESIA, PREVIOUS REACTION, PROFILE
pt reports history of "seizure after cardiac ablation" in 2020 at [facility]/not sure

## 2022-05-11 NOTE — ASU PATIENT PROFILE, ADULT - FALL HARM RISK - UNIVERSAL INTERVENTIONS
Bed in lowest position, wheels locked, appropriate side rails in place/Call bell, personal items and telephone in reach/Instruct patient to call for assistance before getting out of bed or chair/Non-slip footwear when patient is out of bed/[place] to call system/Physically safe environment - no spills, clutter or unnecessary equipment/Purposeful Proactive Rounding/Room/bathroom lighting operational, light cord in reach

## 2022-05-11 NOTE — BRIEF OPERATIVE NOTE - NSICDXBRIEFPREOP_GEN_ALL_CORE_FT
PRE-OP DIAGNOSIS:  Dysmenorrhea 11-May-2022 09:26:00  Silver Mas  Endometriosis 11-May-2022 09:26:29  Silver Mas  
PRE-OP DIAGNOSIS:  Dysmenorrhea 11-May-2022 09:26:00  Silver Mas  Endometriosis 11-May-2022 09:26:29  Silver Mas

## 2022-05-11 NOTE — ASU PATIENT PROFILE, ADULT - NSICDXPASTMEDICALHX_GEN_ALL_CORE_FT
PAST MEDICAL HISTORY:  Anxiety     Arthritis     Dysmenorrhea     Endometriosis     Fibromyalgia     H/O alopecia     H/O sinus tachycardia     History of seizure after cardiac abltion in 2020 at [facility]    Insomnia     Lupus     Lyme arthritis     Menorrhagia     Raynauds syndrome     SVT (supraventricular tachycardia) S/P ablation 2020

## 2022-05-11 NOTE — ASU PATIENT PROFILE, ADULT - FALL HARM RISK - TYPE OF ASSESSMENT
Use this for teeth pain.  To give 150 mg of ibuprofen every 6 hours as needed.  Recheck if not improving.     Admission

## 2022-05-11 NOTE — BRIEF OPERATIVE NOTE - NSICDXBRIEFPROCEDURE_GEN_ALL_CORE_FT
PROCEDURES:  Robot-assisted laparoscopic hysterectomy with cystoscopy 11-May-2022 09:25:41 supracervical hysterectomy Silver Mas  
PROCEDURES:  Robot-assisted laparoscopic hysterectomy with cystoscopy 11-May-2022 09:25:41 supracervical hysterectomy Silver Mas  Cystoscopy, with ureteral catheterization 11-May-2022 11:09:03  Kong Gan

## 2022-05-11 NOTE — BRIEF OPERATIVE NOTE - NSICDXBRIEFPOSTOP_GEN_ALL_CORE_FT
POST-OP DIAGNOSIS:  Dysmenorrhea 11-May-2022 09:26:06  Silver Mas  Endometriosis 11-May-2022 09:26:33  Silver Mas  
POST-OP DIAGNOSIS:  Dysmenorrhea 11-May-2022 09:26:06  Silver Mas  Endometriosis 11-May-2022 09:26:33  Silver Mas

## 2022-05-12 ENCOUNTER — TRANSCRIPTION ENCOUNTER (OUTPATIENT)
Age: 37
End: 2022-05-12

## 2022-05-12 VITALS
TEMPERATURE: 98 F | OXYGEN SATURATION: 95 % | RESPIRATION RATE: 16 BRPM | HEART RATE: 103 BPM | SYSTOLIC BLOOD PRESSURE: 98 MMHG | DIASTOLIC BLOOD PRESSURE: 66 MMHG

## 2022-05-12 RX ADMIN — Medication 1000 MILLIGRAM(S): at 00:34

## 2022-05-12 RX ADMIN — Medication 30 MILLIGRAM(S): at 00:41

## 2022-05-12 RX ADMIN — GABAPENTIN 600 MILLIGRAM(S): 400 CAPSULE ORAL at 09:10

## 2022-05-12 RX ADMIN — Medication 100 MILLIGRAM(S): at 00:41

## 2022-05-12 RX ADMIN — Medication 30 MILLIGRAM(S): at 07:01

## 2022-05-12 RX ADMIN — Medication 30 MILLIGRAM(S): at 06:13

## 2022-05-12 NOTE — ASU DISCHARGE PLAN (ADULT/PEDIATRIC) - NS MD DC FALL RISK RISK
For information on Fall & Injury Prevention, visit: [email or web address] OR  [email or web address] OR  https://www.cdc.gov/steadi/patient.html

## 2022-05-12 NOTE — PROGRESS NOTE ADULT - SUBJECTIVE AND OBJECTIVE BOX
FLASH SWEENEY is a 36y on post-op day #1 s/p RA TLH, cystoscopy    SUBJECTIVE:  No acute events overnight. Pain is well controlled with PRN pain medication. No problems with ambulating, voiding, or PO intake. Has had flatus but no BM. Denies nausea and vomiting.     OBJECTIVE:  Physical exam:  Vital Signs Last 24 Hrs  T(C): 36.8 (12 May 2022 06:21), Max: 37.1 (11 May 2022 16:42)  T(F): 98.2 (12 May 2022 06:21), Max: 98.7 (11 May 2022 16:42)  HR: 78 (12 May 2022 06:21) (60 - 98)  BP: 95/70 (12 May 2022 06:21) (86/50 - 132/82)  RR: 18 (12 May 2022 06:21) (11 - 22)  SpO2: 97% (12 May 2022 06:21) (95% - 100%)  General: alert and oriented x3, no acute distress.  Heart: regular rate and rhythm, no murmurs, rubs or gallops appreciated.  Lungs: clear to auscultation bilaterally.   Abdomen: Soft, appropriately tender to palpitation. Incision appears dry and intact.   Extremities: no tenderness, redness or swelling in lower extremities bilaterally.     05-11-22 @ 07:01  -  05-12-22 @ 07:00  --------------------------------------------------------  IN: 1100 mL / OUT: 570 mL / NET: 530 mL        Labs:                         12.2   17.03 )-----------( 269      ( 11 May 2022 11:27 )             36.1         
    Postoperative day: 1 robotic Subtotal hysterectomy,bilateral salpingectomies    Subjective:  The patient feels well.  She is ambulating.  She is tolerating a regular diet.  She denies nausea and vomiting.  She is passing flatus.  She denies chest pain or shortness of breath.    Physical exam:    Vital Signs Last 24 Hrs  T(C): 36.8 (12 May 2022 06:21), Max: 37.1 (11 May 2022 16:42)  T(F): 98.2 (12 May 2022 06:21), Max: 98.7 (11 May 2022 16:42)  HR: 78 (12 May 2022 06:21) (60 - 98)  BP: 95/70 (12 May 2022 06:21) (86/50 - 132/82)  BP(mean): --  RR: 18 (12 May 2022 06:21) (11 - 22)  SpO2: 97% (12 May 2022 06:21) (95% - 100%)    Gen: NAD  Abdomen: Soft,  appropriately tender  Incision is clean dry and intact  Ext: No calf tenderness  SCD's in place and working    LABS:                        12.2   17.03 )-----------( 269      ( 11 May 2022 11:27 )             36.1     05-11    138  |  108  |  11  ----------------------------<  121<H>  4.1   |  26  |  0.85    Ca    8.7      11 May 2022 11:27        Allergies    Bee sting - swelling, &quot;red line thru my vein&quot; (Other)  lamotrigine (Other)  Lupron Depot (Unknown)    Intolerances        RADIOLOGY & ADDITIONAL TESTS:    Assessment and Plan  POD # 1 s/p robotic, subtotal hysterectomy, bilateral salpingectomies  Doing well.  Tolerating regular diet.  Encouraged ambulation.  Routine post op care.  disch home  f/u in office  instructions and precautions reviewed

## 2022-05-12 NOTE — HISTORY OF PRESENT ILLNESS
[FreeTextEntry1] : 36F h/o chronic active smoker, Lyme disease with chronic joint pain, Raynaud's, fibromyalgia, SLE, chronic palpitations s/p AVNRT ablation in 3/2020 follows with EPS/Dr. Huston still with intermittent palpitations remains on metoprolol, suspected seizure, mild COVID infection 2022, endometriosis with refractory dysmenorrhea and menorrhagia planning for hysterectomy presents for preoperative cardiac risk evaluation. \par \par Surgery scheduled for  unclear which hospital yet. \par Baseline does not exercise but doing house chores and taking care of her children, still with palpitations intermittently and also with L-sided chest discomfort and pain recently, also feels exertional shortness of breath and fatigue for few months. \par \par \par Father with CAD/MI age 40s,  CHF 60s\par Smoke 3/4 pack per day, no alcohol use\par Medical marijuana for nausea/pain\par Not yet receive COVID vaccine\par

## 2022-05-12 NOTE — CARDIOLOGY SUMMARY
[de-identified] : 4/12/22- sinus 70, normal axis, no ST-T changes, QTc 385 [de-identified] : 10/17/19- LV EF 60-65%

## 2022-05-12 NOTE — ADDENDUM
[FreeTextEntry1] : Echo done with LV EF 57%, Exercise treadmill stress test low cardiac risk. \par -patient is optimized from cardiac standpoint and without contraindication to proceed with planned surgery. \par -continue perioperative metoprolol.

## 2022-05-12 NOTE — PROGRESS NOTE ADULT - ASSESSMENT
ASSESSMENT  FLASH SWEENEY is a 36y on post-op day #1 s/p RA TLH, cystoscopy No acute events overnight.     PLAN  1. Routine post-op care  - Continue to encourage ambulation, PO intake and incentive spirometry  - DVT prophylaxis: lovenox, SCDs  - Continue pain management PRN  - Plan to discharge per attending approval

## 2022-05-12 NOTE — DISCUSSION/SUMMARY
[FreeTextEntry1] : 36F h/o chronic active smoker, Lyme disease with chronic joint pain, Raynaud's, fibromyalgia, SLE, chronic palpitations s/p AVNRT ablation in 3/2020 follows with EPS/Dr. Huston still with intermittent palpitations remains on metoprolol, suspected seizure, mild COVID infection 1/2022, endometriosis with refractory dysmenorrhea and menorrhagia planning for hysterectomy presents for preoperative cardiac risk evaluation. \par \par Recent onset exertional dyspnea and chest pain, EKG no ischemic changes but given risk factors for CAD will obtain repeat Echo and exercise treadmill EKG test. If results are normal then no cardiac contraindication for the upcoming GYN surgery. \par \par \par 1. Chest pain, exertional dyspnea- await TTE and EST. \par \par 2. h/o AVNRT s/p ablation, palpitations- continue metoprolol. \par \par 3. Chronic active smoker- strongly advised smoking cessation, wants to defer nicotine replacement therapy until after upcoming surgery. \par \par 4. SLE- on hydroxychloroquine \par \par 4. Preoperative cardiac risk evaluation:\par -revised cardiac risk index of 0\par -if the above cardiac tests are normal then no cardiac contraindication for the GYN surgery. \par \par \par \par Follow up as needed if the above testings are normal.

## 2022-05-12 NOTE — ASU DISCHARGE PLAN (ADULT/PEDIATRIC) - CARE PROVIDER_API CALL
Bud Wadsworth)  Obstetrics and Gynecology  [address] [place] [address]  Phone: [phone]  Fax: [phone]  Established Patient  Follow Up Time:

## 2022-05-18 ENCOUNTER — APPOINTMENT (OUTPATIENT)
Dept: OBGYN | Facility: CLINIC | Age: 37
End: 2022-05-18
Payer: COMMERCIAL

## 2022-05-18 PROBLEM — M32.9 SYSTEMIC LUPUS ERYTHEMATOSUS, UNSPECIFIED: Chronic | Status: ACTIVE | Noted: 2022-05-02

## 2022-05-18 PROBLEM — Z87.898 PERSONAL HISTORY OF OTHER SPECIFIED CONDITIONS: Chronic | Status: ACTIVE | Noted: 2022-05-02

## 2022-05-18 PROBLEM — Z86.79 PERSONAL HISTORY OF OTHER DISEASES OF THE CIRCULATORY SYSTEM: Chronic | Status: ACTIVE | Noted: 2022-05-02

## 2022-05-18 PROBLEM — I47.1 SUPRAVENTRICULAR TACHYCARDIA: Chronic | Status: ACTIVE | Noted: 2022-05-02

## 2022-05-18 PROBLEM — N94.6 DYSMENORRHEA, UNSPECIFIED: Chronic | Status: ACTIVE | Noted: 2022-05-02

## 2022-05-18 PROBLEM — M19.90 UNSPECIFIED OSTEOARTHRITIS, UNSPECIFIED SITE: Chronic | Status: ACTIVE | Noted: 2022-05-02

## 2022-05-18 PROBLEM — N92.0 EXCESSIVE AND FREQUENT MENSTRUATION WITH REGULAR CYCLE: Chronic | Status: ACTIVE | Noted: 2022-05-02

## 2022-05-18 PROCEDURE — 99024 POSTOP FOLLOW-UP VISIT: CPT

## 2022-05-18 NOTE — HISTORY OF PRESENT ILLNESS
[FreeTextEntry1] : Patient is a 36-year-old female is 1 week status post robotic hysterectomy.  Patient has no complaints and states she is doing well.  Pathology was benign discussed with patient and

## 2022-05-18 NOTE — COUNSELING
[Nutrition/ Exercise/ Weight Management] : nutrition, exercise, weight management [Lab Results] : lab results [Pre/Post Op Instructions] : pre/post op instructions

## 2022-06-03 ENCOUNTER — APPOINTMENT (OUTPATIENT)
Dept: OBGYN | Facility: CLINIC | Age: 37
End: 2022-06-03
Payer: COMMERCIAL

## 2022-06-03 VITALS — SYSTOLIC BLOOD PRESSURE: 100 MMHG | HEART RATE: 70 BPM | DIASTOLIC BLOOD PRESSURE: 62 MMHG | RESPIRATION RATE: 16 BRPM

## 2022-06-03 DIAGNOSIS — Z48.89 ENCOUNTER FOR OTHER SPECIFIED SURGICAL AFTERCARE: ICD-10-CM

## 2022-06-03 PROCEDURE — 99024 POSTOP FOLLOW-UP VISIT: CPT

## 2022-06-03 NOTE — HISTORY OF PRESENT ILLNESS
[FreeTextEntry1] : Patient is a 36-year-old female that is 3 weeks status post robotic hysterectomy.  Patient states she is doing well has no complaints

## 2022-06-19 ENCOUNTER — NON-APPOINTMENT (OUTPATIENT)
Age: 37
End: 2022-06-19

## 2022-06-27 ENCOUNTER — APPOINTMENT (OUTPATIENT)
Dept: OBGYN | Facility: CLINIC | Age: 37
End: 2022-06-27
Payer: COMMERCIAL

## 2022-06-27 VITALS — SYSTOLIC BLOOD PRESSURE: 94 MMHG | DIASTOLIC BLOOD PRESSURE: 60 MMHG | HEIGHT: 62 IN

## 2022-06-27 DIAGNOSIS — Z48.89 ENCOUNTER FOR OTHER SPECIFIED SURGICAL AFTERCARE: ICD-10-CM

## 2022-06-27 PROCEDURE — 99024 POSTOP FOLLOW-UP VISIT: CPT

## 2022-06-27 NOTE — HISTORY OF PRESENT ILLNESS
[FreeTextEntry1] : Patient is a 36-year-old female who is 6 weeks status post robotic hysterectomy.  Patient has no complaints and states she is doing well

## 2022-07-06 ENCOUNTER — RX RENEWAL (OUTPATIENT)
Age: 37
End: 2022-07-06

## 2022-07-28 ENCOUNTER — NON-APPOINTMENT (OUTPATIENT)
Age: 37
End: 2022-07-28

## 2022-07-28 ENCOUNTER — APPOINTMENT (OUTPATIENT)
Dept: ELECTROPHYSIOLOGY | Facility: CLINIC | Age: 37
End: 2022-07-28

## 2022-07-28 VITALS
DIASTOLIC BLOOD PRESSURE: 64 MMHG | SYSTOLIC BLOOD PRESSURE: 98 MMHG | WEIGHT: 96 LBS | BODY MASS INDEX: 17.66 KG/M2 | HEART RATE: 83 BPM | OXYGEN SATURATION: 97 % | TEMPERATURE: 98.6 F | HEIGHT: 62 IN

## 2022-07-28 DIAGNOSIS — R00.2 PALPITATIONS: ICD-10-CM

## 2022-07-28 PROCEDURE — 93000 ELECTROCARDIOGRAM COMPLETE: CPT

## 2022-07-28 PROCEDURE — 99214 OFFICE O/P EST MOD 30 MIN: CPT | Mod: 25

## 2022-07-28 RX ORDER — MISOPROSTOL 200 UG/1
200 TABLET ORAL
Qty: 2 | Refills: 0 | Status: DISCONTINUED | COMMUNITY
Start: 2022-05-09 | End: 2022-07-28

## 2022-07-28 RX ORDER — MISOPROSTOL 200 UG/1
200 TABLET ORAL
Qty: 2 | Refills: 0 | Status: DISCONTINUED | COMMUNITY
Start: 2022-05-04 | End: 2022-07-28

## 2022-07-28 NOTE — DISCUSSION/SUMMARY
[EKG obtained to assist in diagnosis and management of assessed problem(s)] : EKG obtained to assist in diagnosis and management of assessed problem(s) [FreeTextEntry1] : Ms. Nevarez is a 35 year old female with history of Lyme disease w/chronic joint pain, Raynaud's\par syndrome, fibromyalgia, suspected SLE and long standing history of palpitations, who presents today for EP follow up s/p successful EPS and RFA of AVNRT on 3/3/20. Her hospital course was complicated by seizure-like events for which she was evaluated by neurology. She has continued neurology follow up and reports she has been free of seizure like activity since discontinuation of LTG. \par \par Since procedure she reports a significant improvement in palpitations. Reports only brief mild palpitations on occasion, not associated with shortness of breath, dizziness, or near syncope as she had felt prior to ablation. In the past, she attempted to wean metoprolol to 12.5 mg daily but she did have an increase in palpitations. \par \par Since last visit she reports she has been feeling well noting mild to moderate brief palpitations which she believes is related to intermittent sinus tachycardia. Worse in the heat. Also dizziness and lightheadedness upon standing quickly.  Borderline BP on Metoprolol, however, she felt worse when Metoprolol dose was previously lowered in the past. \par No recurrent of palpitations she previously experienced with SVT and overall significant symptomatic improvement post ablation. MCOT worn 6/10 - 6/16/22 revealed SR with rare ectopy < 1%. Avg 78 bpm, Min 51 bpm, Maximum 139 bpm.\par \par - Doing well with significant symptomatic improvement s/p ablation for AVNRT 3/2020. To continue metoprolol 25 mg daily as experienced more frequent palpitations when dosage weaned down correlating with ectopy on event monitoring. \par - Encouraged increased hydration, salt intake and minimizing caffeine to counter intermittent sinus tachycardia and borderline BP\par - EP follow up annually or sooner PRN. \par \par Seen and discussed with Dr. Huston\par Kathy MCKEON\par

## 2022-07-28 NOTE — ADDENDUM
[FreeTextEntry1] : I was present for the above evaluation and agree with a history, physical exam, assessment and plan as above. She is doing well at this time- monitor with no arrhythmia and normal sinus rate variation. palpitaiton has correlated with sinus rhythm, and likely exacerbated by dehydration. I did reassure her of the benign nature of sinus tachycardia, and given low BP will defer medical treatment for now.

## 2022-07-28 NOTE — HISTORY OF PRESENT ILLNESS
[FreeTextEntry1] : 35 year old female with history of Lyme disease w/chronic joint pain, Raynaud's syndrome, fibromyalgia, suspected SLE and long standing history of palpitations, who presents today for EP follow up s/p successful EPS and RFA of AVNRT on 3/3/20. Her hospital course was complicated by seizure-like events for which she was evaluated by neurology. She has continued neurology follow up and reports she has been free of seizure like activity since discontinuation of LTG. \par \par Since procedure she reports a significant improvement in palpitations. Reports only brief mild palpitations on occasion, not associated with shortness of breath, dizziness, or near syncope as she had felt prior to ablation. Since last follow up she attempted to wean metoprolol to 12.5 mg daily but she did have an increase in palpitations. Event monitoring worn 6/5/20-6/19/20 revealed SR with rare ectopy < 1%. Diagnosis of SLE confirmed, failed hydroxychloroquine, now on methotrexate. \par \par Since last visit she reports she has been feeling well noting mild to moderate brief palpitations which she believes is related to intermittent sinus tachycardia. Worse in the heat. Also dizziness and lightheadedness upon standing quickly.  Borderline BP on Metoprolol, however, she felt worse when Metoprolol dose was previously lowered in the past. \par NO recurrent of palpitations she previously experienced with SVT and overall significant symptomatic improvement post ablation. MCOT worn 6/10 - 6/16/22 revealed SR with rare ectopy < 1%. Avg 78bpm, Min 51bpm, Maximum 139bpm. Diagnosis of SLE confirmed, failed hydroxychloroquine, now on methotrexate.

## 2022-10-30 NOTE — CONSULT NOTE ADULT - SUBJECTIVE AND OBJECTIVE BOX
used to speak with mom, mom wants pt to have a drug/alcohol test done states pt sneaked out and came home at 4AM. Pt A&OX3 answers questions appropriate pt calm and cooperative admits to drinking \"couple beers\" while being out with friends, denies drug use pt agreeable to blood draw, pt denies SI/HI at this time. Ed md aware of C-SSRS score. pt had no complaints denies cp/sob/n/v/dizziness/ CRITICAL CARE CONSULTATION    REASON FOR CONSULTATION/INTERVAL HPI:   35 yo female with hx of lyme disease, raynauds syndrome, fibromyalgia, undergoing workup for rheumatologic disease, on medical marijuana, underwent elective SVT ablation today.  Afterwards patient suffered a tonic clonic siezure lasting < 1 minute, terminated with versed.    On my exam patient feels ok, denies any headache, nausea, vomiting.    Says she had a seizure in the past while on Lupron    EXAM:  awake and alert x 3, NAD  MMM, PERRL  reg rhythm  lungs clear  abd soft  no motor deficits, no sensory deficits, no facial droop  good cap refill     RADIOLOGY: CT brain - no large bleed on my read, awaiting radiologist read    PAST MEDICAL & SURGICAL HISTORY:  Insomnia  Anxiety  H/O alopecia  Endometriosis  Fibromyalgia  Raynauds syndrome  Lyme arthritis  History of removal of cyst: scalp X 2  H/O oral surgery  H/O laparoscopy: 2&#x27; endometriosis    Allergies    Lupron Depot (Unknown)    Intolerances      SOCIAL HISTORY/FAMILY HISTORY:   Social History:    FAMILY HISTORY:    Medications:  acetaminophen   Tablet .. 650 milliGRAM(s) Oral every 6 hours PRN  aspirin  chewable 81 milliGRAM(s) Oral daily  ketorolac   Injectable 15 milliGRAM(s) IV Push once  metoprolol succinate ER 25 milliGRAM(s) Oral daily  ondansetron Injectable 4 milliGRAM(s) IV Push every 6 hours PRN      12 point ROS performed, pertinent positives and negatives mentioned above, all other ROS negative.     T(F): 98.6 (03-03-20 @ 12:50), Max: 98.6 (03-03-20 @ 12:50)  HR: 100 (03-03-20 @ 12:50)  BP: 119/80 (03-03-20 @ 12:50)  BP(mean): --  ABP: --  RR: 12 (03-03-20 @ 12:50)  SpO2: 98% (03-03-20 @ 12:50)        LABS:                CAPILLARY BLOOD GLUCOSE            CULTURES:

## 2022-12-19 ENCOUNTER — APPOINTMENT (OUTPATIENT)
Dept: OBGYN | Facility: CLINIC | Age: 37
End: 2022-12-19

## 2022-12-19 VITALS
HEIGHT: 62 IN | SYSTOLIC BLOOD PRESSURE: 112 MMHG | WEIGHT: 100 LBS | DIASTOLIC BLOOD PRESSURE: 60 MMHG | BODY MASS INDEX: 18.4 KG/M2

## 2022-12-19 DIAGNOSIS — Z01.419 ENCOUNTER FOR GYNECOLOGICAL EXAMINATION (GENERAL) (ROUTINE) W/OUT ABNORMAL FINDINGS: ICD-10-CM

## 2022-12-19 PROCEDURE — 82270 OCCULT BLOOD FECES: CPT

## 2022-12-19 PROCEDURE — 99395 PREV VISIT EST AGE 18-39: CPT

## 2022-12-19 NOTE — PHYSICAL EXAM
[Appropriately responsive] : appropriately responsive [Alert] : alert [No Acute Distress] : no acute distress [No Lymphadenopathy] : no lymphadenopathy [Regular Rate Rhythm] : regular rate rhythm [No Murmurs] : no murmurs [Clear to Auscultation B/L] : clear to auscultation bilaterally [Soft] : soft [Non-tender] : non-tender [Non-distended] : non-distended [No HSM] : No HSM [No Lesions] : no lesions [No Mass] : no mass [Oriented x3] : oriented x3 [Examination Of The Breasts] : a normal appearance [No Masses] : no breast masses were palpable [Labia Majora] : normal [Labia Minora] : normal [Normal] : normal [Absent] : absent [Uterine Adnexae] : normal [No Tenderness] : no tenderness [Nl Sphincter Tone] : normal sphincter tone [FreeTextEntry5] : Pap done [FreeTextEntry9] : Hemoccult negative

## 2022-12-19 NOTE — HISTORY OF PRESENT ILLNESS
[FreeTextEntry1] : RestPatient is a 37-year-old female presents for routine annual gynecologic examination.  Patient has no complaints.  Patient is status post robotic hysterectomy May 2022.

## 2022-12-20 LAB — HPV HIGH+LOW RISK DNA PNL CVX: NOT DETECTED

## 2022-12-21 LAB — CYTOLOGY CVX/VAG DOC THIN PREP: NORMAL

## 2023-04-25 ENCOUNTER — APPOINTMENT (OUTPATIENT)
Dept: OBGYN | Facility: CLINIC | Age: 38
End: 2023-04-25
Payer: COMMERCIAL

## 2023-04-25 VITALS
DIASTOLIC BLOOD PRESSURE: 56 MMHG | SYSTOLIC BLOOD PRESSURE: 100 MMHG | BODY MASS INDEX: 18.95 KG/M2 | HEIGHT: 62 IN | WEIGHT: 103 LBS

## 2023-04-25 DIAGNOSIS — N93.9 ABNORMAL UTERINE AND VAGINAL BLEEDING, UNSPECIFIED: ICD-10-CM

## 2023-04-25 DIAGNOSIS — Z12.39 ENCOUNTER FOR OTHER SCREENING FOR MALIGNANT NEOPLASM OF BREAST: ICD-10-CM

## 2023-04-25 DIAGNOSIS — R92.2 INCONCLUSIVE MAMMOGRAM: ICD-10-CM

## 2023-04-25 DIAGNOSIS — R10.2 PELVIC AND PERINEAL PAIN: ICD-10-CM

## 2023-04-25 LAB
BILIRUB UR QL STRIP: NORMAL
GLUCOSE UR-MCNC: NORMAL
HCG UR QL: 0.2 EU/DL
HGB UR QL STRIP.AUTO: NORMAL
KETONES UR-MCNC: NORMAL
LEUKOCYTE ESTERASE UR QL STRIP: NORMAL
NITRITE UR QL STRIP: NORMAL
PH UR STRIP: 6.5
PROT UR STRIP-MCNC: NORMAL
SP GR UR STRIP: 1.01

## 2023-04-25 PROCEDURE — 99214 OFFICE O/P EST MOD 30 MIN: CPT | Mod: 25

## 2023-04-25 PROCEDURE — 81003 URINALYSIS AUTO W/O SCOPE: CPT | Mod: QW

## 2023-04-25 NOTE — DISCUSSION/SUMMARY
[FreeTextEntry1] : 1) affirm/std cultures obtained\par 2) rx for TV/complete pelvic sono issued\par 3) rx for mammo/b/l sono issued\par \par will f/u with patient pending receipt of results above.

## 2023-04-25 NOTE — HISTORY OF PRESENT ILLNESS
[Currently Active] : currently active [Men] : men [No] : No [FreeTextEntry1] : Kaylan is a 37  who presents today with c/o noting blood in urine s/p wiping about a week ago and then noticing on her underwear. It happened 4 more times since that time. but not for the past 3 days.  She also reports pelvic/cervical pain.\par \par She is s/p a robotic supracervical hysterectomy/salpingectomy 2022 for treatment of biopsy proven endometriosis.\par \par Her last pap was 22 and was normal.\par \par urine dip today is normal\par \par She requests rx for screening mammo. one was issued at time of annual 2022. rx for b/l breast sono added 2nd to patient's reported history of dense breasts

## 2023-04-27 ENCOUNTER — TRANSCRIPTION ENCOUNTER (OUTPATIENT)
Age: 38
End: 2023-04-27

## 2023-04-27 ENCOUNTER — NON-APPOINTMENT (OUTPATIENT)
Age: 38
End: 2023-04-27

## 2023-04-27 LAB
CANDIDA VAG CYTO: NOT DETECTED
G VAGINALIS+PREV SP MTYP VAG QL MICRO: NOT DETECTED
T VAGINALIS VAG QL WET PREP: NOT DETECTED

## 2023-05-11 ENCOUNTER — NON-APPOINTMENT (OUTPATIENT)
Age: 38
End: 2023-05-11

## 2023-06-01 ENCOUNTER — APPOINTMENT (OUTPATIENT)
Dept: ELECTROPHYSIOLOGY | Facility: CLINIC | Age: 38
End: 2023-06-01
Payer: COMMERCIAL

## 2023-06-01 ENCOUNTER — NON-APPOINTMENT (OUTPATIENT)
Age: 38
End: 2023-06-01

## 2023-06-01 VITALS
HEIGHT: 62 IN | SYSTOLIC BLOOD PRESSURE: 94 MMHG | BODY MASS INDEX: 18.58 KG/M2 | OXYGEN SATURATION: 100 % | HEART RATE: 81 BPM | WEIGHT: 101 LBS | DIASTOLIC BLOOD PRESSURE: 60 MMHG

## 2023-06-01 PROCEDURE — 99214 OFFICE O/P EST MOD 30 MIN: CPT | Mod: 25

## 2023-06-01 PROCEDURE — 93000 ELECTROCARDIOGRAM COMPLETE: CPT

## 2023-06-01 NOTE — END OF VISIT
Transesophageal Echocardiogram    You may eat or drink starting at 4:30pm . Please avoid foods that are hot or cold for the next 3 hours.    Your last dose of pain medication was at________________    What are the side effects of the sedation that you received today?  Side effects depend on the medication used, and may not even be present.    Most Common Sometimes   Irritability  Poor Balance  Sleeping for Several Hours  Drowsiness  Fatigue  Difficulty Concentrating Change in Behavior  Hyperactivity  Nausea (upset stomach)  Gas (flatulence)  Dizziness  Hiccups  Constipation  Blurred Vision     1. The effects of sedation medicine can last up to 24 hours.  You may be drowsy or irritable for 2 to 8 hours after receiving medicine.  2. You may need to sleep after leaving the testing area.  Sleeping after sedation will help reduce irritability.  3. Diet:  - Do not give anything to eat or drink until you are fully awake.  Eat a light meal and advance to a normal diet unless instructed differently:   - Do not drink alcohol beverages for the next 24 hours.  - Avoid greasy or spicy foods today.  4. Activity:  - You may be dizzy and/or unsteady.  Ask for help walking, using the bathroom, or stairs to protect yourself from injury.  - You should not drive a vehicle, operate machinery or power tools for 24 hours because your reflexes may be slow and your vision may be blurry.  - Do not sign any legally binding documents or make important decisions for 24 hours after receiving sedation.  5. Medications:  - Unless told otherwise, do not take any non-prescription medications (cold medicine, etc.) for 24 hours, as these medications in combination with sedation medication, can cause increased drowsiness.  If you feel that you need over the counter medication, discuss with your physician.    When Should I Call the Doctor?  - Vomiting more than twice.  - Extreme irritability or unusual changes in behavior.  - Trouble  arousing.  - Inability to urinate.  - Trouble breathing - call 911.        I have read and understand these instructions and received a copy.  I have received my belongings from home.  We would like to take this opportunity to thank you for choosing [facility]. Because your confidence in your caregivers is very important to us, it is our commitment to always treat you and your family with courtesy and respect. Our goal is to always answer any questions or worries or concerns you may have about the care you received If you have any suggestions for improvement or worries or concerns please do not hesitate to let us know.                  [Time Spent: ___ minutes] : I have spent [unfilled] minutes of time on the encounter.

## 2023-06-01 NOTE — DISCUSSION/SUMMARY
[EKG obtained to assist in diagnosis and management of assessed problem(s)] : EKG obtained to assist in diagnosis and management of assessed problem(s) [FreeTextEntry1] : 37 year old female with history of Lyme disease w/chronic joint pain, Raynaud's syndrome, fibromyalgia, SLE and long standing history of palpitations, who presents for follow up s/p successful EPS and RFA of AVNRT on 3/3/20. Her hospital course was complicated by seizure-like events for which she was evaluated by neurology. She has continued neurology follow up and reports she has been free of seizure like activity since discontinuation of LTG. \par \par Post procedure she reported a significant improvement in palpitations. She attempted to wean metoprolol to 12.5 mg daily but she did have an increase in palpitations. Event monitoring worn 6/5/20-6/19/20 revealed SR with rare ectopy < 1%. Diagnosis of SLE confirmed, maintained on hydroxychloroquine. \par \par MCOT worn 6/10/22- 6/16/22 revealed SR with rare ectopy < 1%. Avg 78bpm, Min 51bpm, Maximum 139bpm. \par \par During previous f/u she noted increase in palpitations which were suspicious to inappropriate ST, that were exacerbated by heat. Supportive measures discussed. \par \par Today, reports her symptoms have been stable since last year. Continues to experience palpitations which sometimes last seconds sometimes last up to an hour. They are not sudden onset and at times she feels what she describes as tunnel vision/brain fog sensation but denies near syncope/syncope. Symptoms are exacerbated by heat. \par \par Recommendation:\par \par -Ms. Nevarez has overall been doing well with stable symptoms of palpitations since last follow up. No sustained arrhythmias have been noted since RFA of AVNRT on 3/3/20. Symptoms may be suspicious for inappropriate ST with SR/ST and rare ectopy noted on prior monitoring. Will place 4 week MCOT for symptom/rhythm correlation. Also encouraged smart watch with EKG capabilities to capture her more infrequent symptoms of prolonged palpitations. Briefly discussed if sustained arrhythmias noted could consider repeat EP study/ablation. If symptoms correlating with ST we discussed supportive measures including more liberal salt intake, increased hydration, and compression stocking use. \par -EP follow up annually or sooner PRN.\par \par Anitha Ruff ANP-C

## 2023-06-01 NOTE — HISTORY OF PRESENT ILLNESS
[FreeTextEntry1] : 37 year old female with history of Lyme disease w/chronic joint pain, Raynaud's syndrome, fibromyalgia, SLE and long standing history of palpitations, who presents for follow up s/p successful EPS and RFA of AVNRT on 3/3/20. Her hospital course was complicated by seizure-like events for which she was evaluated by neurology. She has continued neurology follow up and reports she has been free of seizure like activity since discontinuation of LTG. \par \par Post procedure she reported a significant improvement in palpitations. She attempted to wean metoprolol to 12.5 mg daily but she did have an increase in palpitations. Event monitoring worn 6/5/20-6/19/20 revealed SR with rare ectopy < 1%. Diagnosis of SLE confirmed, maintained on hydroxychloroquine. \par \par MCOT worn 6/10/22- 6/16/22 revealed SR with rare ectopy < 1%. Avg 78bpm, Min 51bpm, Maximum 139bpm. \par \par During previous f/u she noted increase in palpitations which were suspicious to inappropriate ST, that were exacerbated by heat. Supportive measures discussed. \par \par Today, reports her symptoms have been stable since last year. Continues to experience palpitations which sometimes last seconds sometimes last up to an hour. They are not sudden onset and at times she feels what she describes as tunnel vision/brain fog sensation but denies near syncope/syncope. Symptoms are exacerbated by heat. \par

## 2023-09-29 NOTE — H&P PST ADULT - NSCAGESTDRUGANNOY_GEN_A_CORE_SD
C:  Milena Terrazascheng Kimble is here today for Physical and Office Visit      Concerns:  Lower right side pain    Medications: medications verified and updated  Refills needed today?  NO  Preferred pharmacy added   Denies Latex allergy or sensitivity  Tobacco history: verified    Patient would like communication of their results via:    Cell Phone:   Telephone Information:   Mobile [phone]     Okay to leave a message containing results? Yes  Health Maintenance Due   Topic Date Due   • Hepatitis B Vaccine (1 of 3 - 3-dose series) Never done   • Colorectal Cancer Screen-  Never done   • COVID-19 Vaccine (3 - Moderna series) 06/14/2021   • Influenza Vaccine (1) 09/01/2023   • Depression Screening  10/10/2023     Patient is due for topics as listed above but is not proceeding with Immunization(s) COVID-19 and Hep B at this time. .    Immunization History   Administered Date(s) Administered   • COVID Moderna 0.5 mL 12Y+ 03/22/2021, 04/19/2021   • Influenza, Unspecified Formulation 10/05/2019   • Influenza, quadrivalent, preserve-free 12/21/2020, 10/10/2022   • MMR 07/12/2021   • Tdap 10/27/2014, 08/06/2021        Last CPX: 10/10/2022  Last labs: 9/25/2023  Last PSA: None  Last colonoscopy: None      no

## 2023-11-06 ENCOUNTER — APPOINTMENT (OUTPATIENT)
Dept: OBGYN | Facility: CLINIC | Age: 38
End: 2023-11-06
Payer: COMMERCIAL

## 2023-11-06 VITALS
WEIGHT: 98 LBS | DIASTOLIC BLOOD PRESSURE: 70 MMHG | BODY MASS INDEX: 18.03 KG/M2 | SYSTOLIC BLOOD PRESSURE: 110 MMHG | HEIGHT: 62 IN

## 2023-11-06 DIAGNOSIS — R10.2 PELVIC AND PERINEAL PAIN: ICD-10-CM

## 2023-11-06 DIAGNOSIS — N80.9 ENDOMETRIOSIS, UNSPECIFIED: ICD-10-CM

## 2023-11-06 LAB
BILIRUB UR QL STRIP: NORMAL
CLARITY UR: CLEAR
COLLECTION METHOD: NORMAL
GLUCOSE UR-MCNC: NORMAL
HCG UR QL: 0.2 EU/DL
HGB UR QL STRIP.AUTO: NORMAL
KETONES UR-MCNC: NORMAL
LEUKOCYTE ESTERASE UR QL STRIP: NORMAL
NITRITE UR QL STRIP: NORMAL
PH UR STRIP: 6
PROT UR STRIP-MCNC: NORMAL
SP GR UR STRIP: 1.01

## 2023-11-06 PROCEDURE — 99214 OFFICE O/P EST MOD 30 MIN: CPT | Mod: 25

## 2023-11-06 PROCEDURE — 81003 URINALYSIS AUTO W/O SCOPE: CPT | Mod: QW

## 2023-11-08 ENCOUNTER — NON-APPOINTMENT (OUTPATIENT)
Age: 38
End: 2023-11-08

## 2023-11-08 LAB
CANDIDA VAG CYTO: DETECTED
G VAGINALIS+PREV SP MTYP VAG QL MICRO: NOT DETECTED
T VAGINALIS VAG QL WET PREP: NOT DETECTED

## 2023-11-24 ENCOUNTER — APPOINTMENT (OUTPATIENT)
Dept: CARDIOLOGY | Facility: CLINIC | Age: 38
End: 2023-11-24
Payer: COMMERCIAL

## 2023-11-24 ENCOUNTER — NON-APPOINTMENT (OUTPATIENT)
Age: 38
End: 2023-11-24

## 2023-11-24 VITALS
HEIGHT: 62 IN | DIASTOLIC BLOOD PRESSURE: 60 MMHG | OXYGEN SATURATION: 99 % | BODY MASS INDEX: 18.03 KG/M2 | SYSTOLIC BLOOD PRESSURE: 100 MMHG | WEIGHT: 98 LBS | HEART RATE: 99 BPM

## 2023-11-24 DIAGNOSIS — Z01.810 ENCOUNTER FOR PREPROCEDURAL CARDIOVASCULAR EXAMINATION: ICD-10-CM

## 2023-11-24 DIAGNOSIS — Z71.6 TOBACCO ABUSE COUNSELING: ICD-10-CM

## 2023-11-24 PROCEDURE — 93000 ELECTROCARDIOGRAM COMPLETE: CPT

## 2023-11-24 PROCEDURE — 99214 OFFICE O/P EST MOD 30 MIN: CPT | Mod: 25

## 2023-11-24 RX ORDER — NICOTINE POLACRILEX 4 MG/1
4 GUM, CHEWING BUCCAL
Qty: 1 | Refills: 2 | Status: ACTIVE | COMMUNITY
Start: 2023-11-24 | End: 1900-01-01

## 2023-12-27 ENCOUNTER — TRANSCRIPTION ENCOUNTER (OUTPATIENT)
Age: 38
End: 2023-12-27

## 2024-01-22 ENCOUNTER — NON-APPOINTMENT (OUTPATIENT)
Age: 39
End: 2024-01-22

## 2024-01-22 ENCOUNTER — APPOINTMENT (OUTPATIENT)
Dept: CARDIOLOGY | Facility: CLINIC | Age: 39
End: 2024-01-22
Payer: COMMERCIAL

## 2024-01-22 ENCOUNTER — APPOINTMENT (OUTPATIENT)
Dept: OBGYN | Facility: CLINIC | Age: 39
End: 2024-01-22
Payer: COMMERCIAL

## 2024-01-22 VITALS
BODY MASS INDEX: 18.22 KG/M2 | WEIGHT: 99 LBS | HEIGHT: 62 IN | OXYGEN SATURATION: 99 % | HEART RATE: 87 BPM | SYSTOLIC BLOOD PRESSURE: 98 MMHG | DIASTOLIC BLOOD PRESSURE: 63 MMHG

## 2024-01-22 VITALS
WEIGHT: 99 LBS | DIASTOLIC BLOOD PRESSURE: 60 MMHG | SYSTOLIC BLOOD PRESSURE: 106 MMHG | HEIGHT: 62 IN | BODY MASS INDEX: 18.22 KG/M2

## 2024-01-22 DIAGNOSIS — I47.19 OTHER SUPRAVENTRICULAR TACHYCARDIA: ICD-10-CM

## 2024-01-22 DIAGNOSIS — N76.0 ACUTE VAGINITIS: ICD-10-CM

## 2024-01-22 DIAGNOSIS — N88.9 NONINFLAMMATORY DISORDER OF CERVIX UTERI, UNSPECIFIED: ICD-10-CM

## 2024-01-22 DIAGNOSIS — R61 GENERALIZED HYPERHIDROSIS: ICD-10-CM

## 2024-01-22 DIAGNOSIS — F17.200 NICOTINE DEPENDENCE, UNSPECIFIED, UNCOMPLICATED: ICD-10-CM

## 2024-01-22 DIAGNOSIS — Z01.810 ENCOUNTER FOR PREPROCEDURAL CARDIOVASCULAR EXAMINATION: ICD-10-CM

## 2024-01-22 DIAGNOSIS — M54.2 CERVICALGIA: ICD-10-CM

## 2024-01-22 PROCEDURE — 93000 ELECTROCARDIOGRAM COMPLETE: CPT | Mod: NC

## 2024-01-22 PROCEDURE — 99213 OFFICE O/P EST LOW 20 MIN: CPT | Mod: 25

## 2024-01-22 PROCEDURE — 36415 COLL VENOUS BLD VENIPUNCTURE: CPT

## 2024-01-22 PROCEDURE — 99214 OFFICE O/P EST MOD 30 MIN: CPT

## 2024-01-22 RX ORDER — TERCONAZOLE 8 MG/G
0.8 CREAM VAGINAL
Qty: 1 | Refills: 0 | Status: DISCONTINUED | COMMUNITY
Start: 2023-11-09 | End: 2024-01-22

## 2024-01-22 RX ORDER — FLUCONAZOLE 150 MG/1
150 TABLET ORAL
Qty: 2 | Refills: 0 | Status: DISCONTINUED | COMMUNITY
Start: 2023-11-08 | End: 2024-01-22

## 2024-01-22 NOTE — PHYSICAL EXAM
[Labia Majora Erythema] : erythema of the labia majora [Labia Minora Erythema] : erythema of the labia minora [Normal] : urethra [FreeTextEntry5] : small round, white, firm lesion noted on cervix at 10 o'clock

## 2024-01-22 NOTE — DISCUSSION/SUMMARY
[FreeTextEntry1] : 38F h/o chronic active smoker, Lyme disease with chronic joint pain, Raynaud's, fibromyalgia, SLE, chronic palpitations s/p AVNRT ablation in 3/2020 follows with EPS/Dr. Huston still with intermittent palpitations remains on metoprolol, suspected seizure, mild COVID infection 1/2022, endometriosis with refractory dysmenorrhea and menorrhagia planning for hysterectomy presented for preoperative cardiac risk evaluation seen on 4/2022 Echo with LV EF 57% and EST at low risk, followed up with EP in 6/2023 for palpitations/tachycardia repeat MCOT sinus rhythm HR ranges 50 to 120 without arrhythmia, recent ER visit with profuse vomiting/diarrhea pending EGD/colonoscopy with sinus tachycardia, last seen 11/2023 advised smoking cessation, now scheduled for the procedure on 1/24/24 at [facility] with Dr. Schwartz, returns for follow up.    No evidence of POTS on exam as HR remains 80s upong standing and no drop in SBP; suspect prior sinus tachycardia and near syncope was related to dehydration, continue low dose metoprolol for suspicion of inappropriate sinus tachycardia.     1. Preprocedural cardiac risk eval.- no cardiac contraindication to proceed with EGD/colonoscopy, currently optimized.   2. h/o AVNRT s/p ablation, palpitations- continue low dose metoprolol.   3. Chronic active smoker- strongly advised smoking cessation, willing to try nicotine gm, advised to also add patch but she wants to wait.   4. SLE- on hydroxychloroquine      Follow up as needed if new symptoms arise, has EP appointment in 6/2024.  [EKG obtained to assist in diagnosis and management of assessed problem(s)] : EKG obtained to assist in diagnosis and management of assessed problem(s)

## 2024-01-22 NOTE — PHYSICAL EXAM

## 2024-01-22 NOTE — HISTORY OF PRESENT ILLNESS
[FreeTextEntry1] : 38F h/o chronic active smoker, Lyme disease with chronic joint pain, Raynaud's, fibromyalgia, SLE, chronic palpitations s/p AVNRT ablation in 3/2020 follows with EPS/Dr. Huston still with intermittent palpitations remains on metoprolol, suspected seizure, mild COVID infection 2022, endometriosis with refractory dysmenorrhea and menorrhagia planning for hysterectomy presented for preoperative cardiac risk evaluation seen on 2022 Echo with LV EF 57% and EST at low risk, followed up with EP in 2023 for palpitations/tachycardia repeat MCOT sinus rhythm HR ranges 50 to 120 without arrhythmia, recent ER visit with profuse vomiting/diarrhea pending EGD/colonoscopy with sinus tachycardia, last seen 2023 advised smoking cessation, now scheduled for the procedure on 24 at [facility] with Dr. Schwartz, returns for follow up.   [phone] Still gets intermittent palpitations has not been wearing the watch, she feels maybe in perimenopausal period, due to cold weather with stiffness with walking. Has not yet quit smoking but down to 1/2 pack daily.   Prior visit 2023:  Reports been seeing hematology for low iron and lupus has noted intermittent dizziness with near syncope with still labile HR, concerning for POTS to advise getting workup, denies prior syncope, has been ongoing for 3-4 months, been taking metoprolol daily, active with walking but no cardio type exercise with recent move. Seeing Dr. Shilo Schwartz pending EGD/colonoscopy schedule to be done as inpatient.   Stil smoking 3/4 pack daily.    Prior visit 2023:  Surgery scheduled for  unclear which hospital yet.  Baseline does not exercise but doing house chores and taking care of her children, still with palpitations intermittently and also with L-sided chest discomfort and pain recently, also feels exertional shortness of breath and fatigue for few months.    Father with CAD/MI age 40s,  CHF 60s Smoke 3/4 pack per day, no alcohol use Medical marijuana for nausea/pain Not yet receive COVID vaccine

## 2024-01-23 LAB
ESTRADIOL SERPL-MCNC: 135 PG/ML
FSH SERPL-MCNC: 3.3 IU/L
LH SERPL-ACNC: 2.2 IU/L
PROLACTIN SERPL-MCNC: 5.8 NG/ML
T3FREE SERPL-MCNC: 2.38 PG/ML
T4 FREE SERPL-MCNC: 1 NG/DL
TSH SERPL-ACNC: 1.99 UIU/ML

## 2024-01-29 ENCOUNTER — NON-APPOINTMENT (OUTPATIENT)
Age: 39
End: 2024-01-29

## 2024-01-29 ENCOUNTER — OUTPATIENT (OUTPATIENT)
Dept: OUTPATIENT SERVICES | Facility: HOSPITAL | Age: 39
LOS: 1 days | End: 2024-01-29
Payer: COMMERCIAL

## 2024-01-29 ENCOUNTER — APPOINTMENT (OUTPATIENT)
Dept: ULTRASOUND IMAGING | Facility: CLINIC | Age: 39
End: 2024-01-29
Payer: COMMERCIAL

## 2024-01-29 DIAGNOSIS — Z98.890 OTHER SPECIFIED POSTPROCEDURAL STATES: Chronic | ICD-10-CM

## 2024-01-29 DIAGNOSIS — M54.2 CERVICALGIA: ICD-10-CM

## 2024-01-29 LAB
C TRACH RRNA SPEC QL NAA+PROBE: NOT DETECTED
CANDIDA VAG CYTO: DETECTED
DHEA-SULFATE, SERUM: 241 UG/DL
G VAGINALIS+PREV SP MTYP VAG QL MICRO: NOT DETECTED
N GONORRHOEA RRNA SPEC QL NAA+PROBE: NOT DETECTED
SOURCE AMPLIFICATION: NORMAL
T VAGINALIS VAG QL WET PREP: NOT DETECTED
TESTOST FREE SERPL-MCNC: 0.6 PG/ML
TESTOST SERPL-MCNC: 26.8 NG/DL

## 2024-01-29 PROCEDURE — 76830 TRANSVAGINAL US NON-OB: CPT | Mod: 26

## 2024-01-29 PROCEDURE — 76830 TRANSVAGINAL US NON-OB: CPT

## 2024-01-31 ENCOUNTER — APPOINTMENT (OUTPATIENT)
Dept: OBGYN | Facility: CLINIC | Age: 39
End: 2024-01-31
Payer: COMMERCIAL

## 2024-01-31 VITALS
BODY MASS INDEX: 18.29 KG/M2 | HEIGHT: 62 IN | WEIGHT: 99.38 LBS | DIASTOLIC BLOOD PRESSURE: 60 MMHG | SYSTOLIC BLOOD PRESSURE: 102 MMHG

## 2024-01-31 DIAGNOSIS — N94.10 UNSPECIFIED DYSPAREUNIA: ICD-10-CM

## 2024-01-31 DIAGNOSIS — Z90.711 ACQUIRED ABSENCE OF UTERUS WITH REMAINING CERVICAL STUMP: ICD-10-CM

## 2024-01-31 DIAGNOSIS — Z87.42 PERSONAL HISTORY OF OTHER DISEASES OF THE FEMALE GENITAL TRACT: ICD-10-CM

## 2024-01-31 PROCEDURE — 99214 OFFICE O/P EST MOD 30 MIN: CPT

## 2024-01-31 NOTE — DISCUSSION/SUMMARY
[FreeTextEntry1] : Impression: Frequent yeast infections, dyspareunia, history of endometriosis, history of hysterectomy  Recommendations and discussion as noted above dictated narrative.  Patient given referral for pelvic floor physical therapy  Follow-up as needed and for routine GYN care

## 2024-01-31 NOTE — HISTORY OF PRESENT ILLNESS
[FreeTextEntry1] : Patient is a 38-year-old female who presents with complaints of dyspareunia and pain in the cervical area.  Patient also complains of having more frequent yeast infections recently.  Patient has significant autoimmune issues and does take prednisone periodically.  Patient with a significant history of endometriosis, chronic pelvic pain and dysmenorrhea for which the patient had a robotic hysterectomy in May 2022.  Patient states that this discomfort has been occurring more recently in the last few months.  Patient's hormonal lab work was within normal limits.  Patient had a recent pelvic ultrasound which was within normal limits post hysterectomy.  In view of the patient's complaints and findings patient was advised to start taking probiotic supplements, decrease sugar intake in diet, drink 1 ounce of apple cider vinegar daily and use vaginal pH balancing gel.  All to reduce frequency of yeast infections.  Regarding dyspareunia in light of patient's history advised patient undergo pelvic floor physical therapy.  Risk benefits and alternatives reviewed and questions answered.  Patient is agreeable with recommendations.

## 2024-02-05 ENCOUNTER — APPOINTMENT (OUTPATIENT)
Dept: CARDIOLOGY | Facility: CLINIC | Age: 39
End: 2024-02-05

## 2024-02-27 ENCOUNTER — APPOINTMENT (OUTPATIENT)
Dept: ORTHOPEDIC SURGERY | Facility: CLINIC | Age: 39
End: 2024-02-27

## 2024-02-28 ENCOUNTER — RX RENEWAL (OUTPATIENT)
Age: 39
End: 2024-02-28

## 2024-02-28 RX ORDER — METOPROLOL SUCCINATE 25 MG/1
25 TABLET, EXTENDED RELEASE ORAL
Qty: 90 | Refills: 3 | Status: ACTIVE | COMMUNITY
Start: 2021-07-06 | End: 1900-01-01

## 2024-04-17 ENCOUNTER — APPOINTMENT (OUTPATIENT)
Dept: OBGYN | Facility: CLINIC | Age: 39
End: 2024-04-17

## 2024-05-15 ENCOUNTER — APPOINTMENT (OUTPATIENT)
Dept: OBGYN | Facility: CLINIC | Age: 39
End: 2024-05-15

## 2024-06-22 ENCOUNTER — NON-APPOINTMENT (OUTPATIENT)
Age: 39
End: 2024-06-22

## 2024-06-27 ENCOUNTER — NON-APPOINTMENT (OUTPATIENT)
Age: 39
End: 2024-06-27

## 2024-06-27 ENCOUNTER — APPOINTMENT (OUTPATIENT)
Dept: ELECTROPHYSIOLOGY | Facility: CLINIC | Age: 39
End: 2024-06-27
Payer: COMMERCIAL

## 2024-06-27 VITALS
OXYGEN SATURATION: 98 % | DIASTOLIC BLOOD PRESSURE: 60 MMHG | WEIGHT: 99 LBS | HEIGHT: 62 IN | HEART RATE: 74 BPM | BODY MASS INDEX: 18.22 KG/M2 | SYSTOLIC BLOOD PRESSURE: 92 MMHG

## 2024-06-27 PROCEDURE — 93000 ELECTROCARDIOGRAM COMPLETE: CPT | Mod: 59

## 2024-06-27 PROCEDURE — 99215 OFFICE O/P EST HI 40 MIN: CPT | Mod: 25

## 2024-12-03 ENCOUNTER — NON-APPOINTMENT (OUTPATIENT)
Age: 39
End: 2024-12-03

## 2024-12-03 ENCOUNTER — APPOINTMENT (OUTPATIENT)
Dept: CARDIOLOGY | Facility: CLINIC | Age: 39
End: 2024-12-03
Payer: COMMERCIAL

## 2024-12-03 VITALS
OXYGEN SATURATION: 99 % | HEART RATE: 85 BPM | HEIGHT: 62 IN | SYSTOLIC BLOOD PRESSURE: 92 MMHG | DIASTOLIC BLOOD PRESSURE: 60 MMHG | BODY MASS INDEX: 18.77 KG/M2 | WEIGHT: 102 LBS

## 2024-12-03 DIAGNOSIS — R42 DIZZINESS AND GIDDINESS: ICD-10-CM

## 2024-12-03 DIAGNOSIS — I47.19 OTHER SUPRAVENTRICULAR TACHYCARDIA: ICD-10-CM

## 2024-12-03 DIAGNOSIS — I95.9 HYPOTENSION, UNSPECIFIED: ICD-10-CM

## 2024-12-03 DIAGNOSIS — Z71.6 TOBACCO ABUSE COUNSELING: ICD-10-CM

## 2024-12-03 PROCEDURE — 99214 OFFICE O/P EST MOD 30 MIN: CPT

## 2024-12-03 PROCEDURE — 93000 ELECTROCARDIOGRAM COMPLETE: CPT

## 2024-12-03 PROCEDURE — G2211 COMPLEX E/M VISIT ADD ON: CPT | Mod: NC

## 2024-12-13 ENCOUNTER — APPOINTMENT (OUTPATIENT)
Dept: CARDIOLOGY | Facility: CLINIC | Age: 39
End: 2024-12-13
Payer: COMMERCIAL

## 2024-12-13 DIAGNOSIS — R00.2 PALPITATIONS: ICD-10-CM

## 2024-12-13 DIAGNOSIS — R07.9 CHEST PAIN, UNSPECIFIED: ICD-10-CM

## 2024-12-13 PROCEDURE — 93880 EXTRACRANIAL BILAT STUDY: CPT

## 2025-02-14 ENCOUNTER — RX RENEWAL (OUTPATIENT)
Age: 40
End: 2025-02-14

## 2025-04-09 ENCOUNTER — TRANSCRIPTION ENCOUNTER (OUTPATIENT)
Age: 40
End: 2025-04-09

## 2025-05-07 ENCOUNTER — APPOINTMENT (OUTPATIENT)
Dept: OBGYN | Facility: CLINIC | Age: 40
End: 2025-05-07
Payer: COMMERCIAL

## 2025-05-07 ENCOUNTER — NON-APPOINTMENT (OUTPATIENT)
Age: 40
End: 2025-05-07

## 2025-05-07 VITALS
DIASTOLIC BLOOD PRESSURE: 60 MMHG | SYSTOLIC BLOOD PRESSURE: 110 MMHG | WEIGHT: 100 LBS | HEIGHT: 62 IN | BODY MASS INDEX: 18.4 KG/M2

## 2025-05-07 DIAGNOSIS — R92.30 DENSE BREASTS, UNSPECIFIED: ICD-10-CM

## 2025-05-07 DIAGNOSIS — Z90.710 ACQUIRED ABSENCE OF BOTH CERVIX AND UTERUS: ICD-10-CM

## 2025-05-07 DIAGNOSIS — Z01.419 ENCOUNTER FOR GYNECOLOGICAL EXAMINATION (GENERAL) (ROUTINE) W/OUT ABNORMAL FINDINGS: ICD-10-CM

## 2025-05-07 DIAGNOSIS — M32.9 SYSTEMIC LUPUS ERYTHEMATOSUS, UNSPECIFIED: ICD-10-CM

## 2025-05-07 DIAGNOSIS — F17.200 NICOTINE DEPENDENCE, UNSPECIFIED, UNCOMPLICATED: ICD-10-CM

## 2025-05-07 LAB
CARD LOT #: NORMAL
CARD LOT EXP DATE: NORMAL
DATE COLLECTED: NORMAL
DATE COLLECTED: NORMAL
DEVELOPER LOT #: NORMAL
DEVELOPER LOT EXP DATE: NORMAL
HEMOCCULT 2: NEGATIVE
HEMOCCULT SP1 STL QL: NEGATIVE
QUALITY CONTROL: YES
QUALITY CONTROL: YES

## 2025-05-07 PROCEDURE — 99395 PREV VISIT EST AGE 18-39: CPT

## 2025-05-07 PROCEDURE — 82270 OCCULT BLOOD FECES: CPT

## 2025-05-09 LAB — HPV HIGH+LOW RISK DNA PNL CVX: NOT DETECTED

## 2025-05-12 PROBLEM — R92.30 DENSE BREASTS: Status: ACTIVE | Noted: 2025-05-12

## 2025-05-12 PROBLEM — M32.9 LUPUS (SYSTEMIC LUPUS ERYTHEMATOSUS): Status: ACTIVE | Noted: 2025-05-12

## 2025-05-12 PROBLEM — Z01.419 WELL WOMAN EXAM WITH ROUTINE GYNECOLOGICAL EXAM: Status: ACTIVE | Noted: 2025-05-12

## 2025-05-12 PROBLEM — F17.200 SMOKER: Status: ACTIVE | Noted: 2025-05-12

## 2025-05-12 PROBLEM — Z90.710 HISTORY OF HYSTERECTOMY: Status: ACTIVE | Noted: 2025-05-12

## 2025-05-12 LAB — CYTOLOGY CVX/VAG DOC THIN PREP: NORMAL

## 2025-07-23 ENCOUNTER — APPOINTMENT (OUTPATIENT)
Dept: CARDIOLOGY | Facility: CLINIC | Age: 40
End: 2025-07-23
Payer: COMMERCIAL

## 2025-07-23 VITALS
BODY MASS INDEX: 17.66 KG/M2 | SYSTOLIC BLOOD PRESSURE: 104 MMHG | DIASTOLIC BLOOD PRESSURE: 68 MMHG | OXYGEN SATURATION: 96 % | WEIGHT: 96 LBS | HEIGHT: 62 IN | HEART RATE: 80 BPM

## 2025-07-23 DIAGNOSIS — R00.0 TACHYCARDIA, UNSPECIFIED: ICD-10-CM

## 2025-07-23 DIAGNOSIS — Z71.6 TOBACCO ABUSE COUNSELING: ICD-10-CM

## 2025-07-23 PROCEDURE — 93000 ELECTROCARDIOGRAM COMPLETE: CPT

## 2025-07-23 PROCEDURE — 99214 OFFICE O/P EST MOD 30 MIN: CPT | Mod: 25

## 2025-07-23 PROCEDURE — 99406 BEHAV CHNG SMOKING 3-10 MIN: CPT
